# Patient Record
Sex: MALE | Race: WHITE
[De-identification: names, ages, dates, MRNs, and addresses within clinical notes are randomized per-mention and may not be internally consistent; named-entity substitution may affect disease eponyms.]

---

## 2017-09-28 NOTE — XMS
Summary of Care

 Created on:2017



Patient:FCO OSMAN

Sex:Male

:1975

External Reference #:[id number]





Demographics







 Address  [address] [place] [address]

 

 Phone  Unavailable

 

 Preferred Language  English

 

 Marital Status  Unknown

 

 Adventism Affiliation  Unknown

 

 Race  Other Race

 

 Ethnic Group  Not  or 









Author







 Name  Keith Perez

 

 Address  Unavailable



   Unavailable



   ,









Care Team Providers







 Name  Role  Phone

 

 Keith Perez  Unavailable  Unavailable

 

 HUNTER CERVANTES  Unavailable  Unavailable

 

 CAITLYN JONES MD  Primary Care Provider  Unavailable

 

 CAITLYN DWYER  Unavailable  Unavailable

 

 ,  Unavailable  Unavailable









Functional Status

Functional Status Health Issues





 Name  Dates  Details

 

 Functional status health issues are not documented    Status:



Cognitive Status Health Issues





 Name  Dates  Details

 

 Cognitive status health issues are not documented    Status:







Problems







 Name  Dates  Details

 

 Intracranial injury, initial encounter    Status:Active

 

 Cerumen impaction(380.4, H61.20)    Status:Active

 

 Well adult on routine health check(V70.0, Z00.00)    Status:Active

 

 Influenza vaccine needed(V04.81, Z23)    Status:Active

 

 Hypothyroidism(244.9, E03.9)    Status:Active

 

 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

 

 Depressive disorder(311, F32.9)    Status:Active

 

 Behavioral problems(V40.9)    Status:Active

 

 Head injury(959.01, S09.90XA)    Status:Active

 

 Essential hypertriglyceridemia(272.1, E78.1)    Status:Active

 

 Elevated liver function tests(790.6, R94.5)    Status:Active

 

 Hyperlipidemia(272.4, E78.5)    Status:Active

 

 Pre-diabetes(790.29, R73.03)    Status:Active

 

 Need for hepatitis A and B vaccination(V05.3, Z23)    Status:Active







Medications







 Name  Dates  Details









 Divalproex Sodium  MG Oral Tablet Extended Release 24 Hour



 TAKE THREE (3) TABLETS BY MOUTH EVERY MORNING AND THREE (3) TABLETS EVERY 
EVENING.









   Quantity:180  Refills:0







HUNTER CERVANTES M.D.





 Started4-May-2010



ActiveSertraline HCl - 100 MG Oral Tablet

TAKE ONE (1) TABLET(S) BY MOUTH TWICE A DAY.







 Quantity:60  Refills:0







HUNTER CERVANTES M.D.





 Started4-May-2010



ActiveLevothyroxine Sodium 100 MCG Oral Tablet

TAKE 1 TABLET DAILY AS DIRECTED.







 Quantity:90  Refills:1







Keith Perez N.P.





 [id number]



ActiveRosuvastatin Calcium 10 MG Oral Tablet

TAKE ONE (1) TABLET(S) BY MOUTH ONCE A DAY.







 Quantity:90  Refills:1







Keith Perez N.PKallie





 Started4-Dec-2012



ActiveOmega-3-acid Ethyl Esters 1 GM Oral Capsule

TAKE TWO (2) CAPSULE(S) BY MOUTH TWICE A DAY.







 Quantity:360  Refills:0







Keith Perez N.JONATHON





 [id number]



ActiveFenofibrate 145 MG Oral Tablet

TAKE 1 TABLET DAILY.







 Quantity:90  Refills:1







Keith Perez N.JONATHON





 Started4-Dec-2012



ActiveOLANZapine 5 MG Oral Tablet

TAKE ONE (1) TABLET(S) BY MOUTH AT BEDTIME. TAKE WITH OLANZAPINE 20 MG.







 Quantity:30  Refills:0







HUNTER CERVANTES M.D.





 [id number]



ActiveOmega-3-acid Ethyl Esters 1 GM Oral Capsule

TAKE 2 CAPSULE EVERY 12 HOURS







 Quantity:360  Refills:3







Keith Perez N.PKallie





 [id number]



ActiveFenofibrate 145 MG Oral Tablet

TAKE one (1) tablet(s) by mouth every day.







 Quantity:90  Refills:3







Keith Perez N.JONATHON





 Started10-Sep-2014



ActiveOLANZapine 20 MG Oral Tablet

TAKE ONE (1) TABLET(S) BY MOUTH AT BEDTIME ALONG WITH OLANZAPINE 5MG.







 Quantity:30  Refills:0







HUNTER CERVANTES M.D.





 Started24-Sep-2014



Active



Allergies and Adverse Reactions







 Name  Dates  Details

 

 No Known Drug Allergies    Status:Active







Past Medical History







 Name  Dates  Details

 

 Head injury(959.01, S09.90XA)    Status:Active

 

 History of Acute upper respiratory infection(465.9, J06.9)    Status:Resolved

 

 History of Cough(786.2, R05)    Status:Resolved

 

 History of essential hypertension(V12.59, Z86.79)    Status:Resolved

 

 History of hyperlipidemia(V12.29, Z86.39)    Status:Resolved

 

 History of hypothyroidism(V12.29, Z86.39)    Status:Resolved

 

 History of obesity(V13.89, Z86.39)    Status:Resolved







Procedures







 Procedure  Dates  Details

 

 History of Brain Surgery    

 

 History of Inguinal Hernia Repair    

 

 History of Creation Of Ventriculo-Peritoneal CSF Shunt    

 

 [[facility]] LIPID PANEL  Ordered:2017  

 

 [[facility]] CMP W/EGFR  Ordered:2017  

 

 [[facility]] HEMOGLOBIN A1c  Ordered:2017  

 

  Liver [address]  Ordered:2017  







Immunization







 Name  Dates  Details

 

 Influenza  Administered on:20-Oct-2011  



 Lot #:[id number]    

 

 Influenza  Administered on:2014  



 Lot #:[id number]    

 

 Fluzone Quadrivalent 0.5 ML Intramuscular Suspension  Administered on:2016  



 Lot #:[id number]    

 

 Fluzone Quadrivalent 0.5 ML Intramuscular Suspension Prefilled Syringe  
Administered on:26-Oct-2016  



 Lot #:[id number]    







Family History

Mother





 Name  Dates  Details

 

 Family history of dementia(V17.2, Z81.8)    Status:Active

 

 Family history of depression(V17.0, Z81.8)    Status:Active



Father





 Name  Dates  Details

 

 Family history of dementia(V17.2, Z81.8)    Status:Active

 

 Family history of Alcohol dependence(303.90, F10.20)    Status:Active



Sister





 Name  Dates  Details

 

 Family history of depression(V17.0, Z81.8)    Status:Active



Brother





 Name  Dates  Details

 

 Family history of depression(V17.0, Z81.8)    Status:Active







Social History







 Name  Dates  Details

 

 -    



Smoking StatusNever smoker



Vital Signs







 Date  Test  Result  Details

 

       

 

 2017 11:10  BP Systolic  111mm[Hg]  Status:









 BP Diastolic  74mm[Hg]  Status:

 

 Temperature  97.9f  Status:

 

 Heart Rate  77/min  Status:

 

 Physical Findings  16  Status:Comments:Respiration

 

 Height  72in  Status:

 

 Weight  242.25lb  Status:

 

 Body Mass Index Calculated  32.86kg/m2  Status:

 

 Body Surface Area Calculated  2.31m2  Status:









       

 

 2017 09:45  BP Systolic  118mm[Hg]  Status:









 BP Diastolic  76mm[Hg]  Status:

 

 Height  72in  Status:

 

 Weight  242lb  Status:

 

 Body Mass Index Calculated  32.82kg/m2  Status:

 

 Body Surface Area Calculated  2.31m2  Status:







Results







 Date  Description  Value  Details

 

   Results not documented    



       

 

       







Plan of Care

Planned Observations





 Name  Dates  Details

 

 Planned Goals not documented    Goal



Planned Encounters





 Follow-up visit in 4 months  









 Appointment; Provider: HUNTER CERVANTES  [id number]



   11:00



Planned Medications





 Name  Dates  Details

 

 HAV-HBV (Twinrix)  Ordered:1-Mar-2017  ActiveComments:To be Done: Dates 
Schedule; 2017; 2017







Interventions Provided

Labs/Procedures/Imaging[[facility]] CMP W/EGFR; To be Done: 2017[[facility]] 
HEMOGLOBIN A1c; To be Done: 2017[[facility]] LIPID PANEL; To be Done: 2017US Liver [address]; To be Done: 2017InstructionsPatient Specific 
Education Given; Done:



Instructions

Instructions not documented



Encounters







 Appointment; HUNTER CERVANTES  [id number]



 Encounter Diagnosis:Problem not documented  09:30

 

 Appointment; Keith Perez  



 Encounter Diagnosis:Problem not documented  11:00

 

 Appointment; Keith Perez  On26-Oct-2016



 Encounter Diagnosis:Problem not documented  10:30

 

 Appointment; HUNTER CERVANTES  On10-Oct-2016



 Encounter Diagnosis:Problem not documented  13:30

 

 Appointment; Keith Perez  



 Encounter Diagnosis:Problem not documented  14:00

 

 Appointment; Keith Perez  



 Encounter Diagnosis:Problem not documented  14:00

 

 Appointment; HUNTER CERVANTES  



 Encounter Diagnosis:Problem not documented  10:30

 

 Appointment; HUNTER CERVANTES  



 Encounter Diagnosis:Problem not documented  13:00

 

 Appointment; Keith Perez  



 Encounter Diagnosis:Problem not documented  13:00

 

 Appointment; YOLANDA MAGDALENO  



 Encounter Diagnosis:Problem not documented  11:00

 

 Appointment; Keith Perez  



 Encounter Diagnosis:Problem not documented  14:00

 

 Appointment; HAMMAD DASH  On25-Mar-2015



 Encounter Diagnosis:Problem not documented  11:00

## 2017-09-28 NOTE — RAD
SHUNTOGRAM:

 

History: Lethargy. 42-year-old with ventriculoperitoneal shunt. 

 

Technique: AP skull and soft tissue neck, AP view chest, lateral view soft tissue neck, lateral view
 chest, AP abdomen, lateral view of the abdomen. 

 

FINDINGS: 

There is a large amount of stool in the colon. 

 

There is a right sided intracranial ventricular shunt which has a segment absent from the point wher
e it leaves the skull until the C3 level. The shunt extends along the lateral aspect of the soft tis
sues of the neck extending along the anterior aspect of the chest extending into the right pleural s
urface. 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 The proximal portion along the la
teral aspect of the neck and scalp appears to have disrupted and does not communicate with the intra
cranial portion and the right pleural surface. A small right sided pleural effusion is present. The 
left lung is well aerated. 

 

POS: [facility]

## 2017-09-28 NOTE — XMS
Summary of Care

 Created on:March 3, 2017



Patient:FCO OSMAN

Sex:Male

:1975

External Reference #:[id number]





Demographics







 Address  [address] [place] [address]

 

 Phone  Unavailable

 

 Preferred Language  Unknown

 

 Marital Status  Unknown

 

 Muslim Affiliation  Unknown

 

 Race  White

 

 Ethnic Group  Not  or 









Author







 Name  Grace Mariee

 

 Address  Unavailable



   Unavailable



   ,









Care Team Providers







 Name  Role  Phone

 

 Keith Perez  Unavailable  Unavailable

 

 Grace Mariee  Unavailable  Unavailable

 

 HUNTER CERVANTES  Unavailable  Unavailable

 

 CAITLYN JONES MD  Primary Care Provider  Unavailable

 

 CAITLYN DWYER  Unavailable  Unavailable

 

 ,  Unavailable  Unavailable









Functional Status

Functional Status Health Issues





 Name  Dates  Details

 

 Functional status health issues are not documented    Status:



Cognitive Status Health Issues





 Name  Dates  Details

 

 Cognitive status health issues are not documented    Status:







Problems







 Name  Dates  Details

 

 Intracranial injury, initial encounter    Status:Active

 

 Cerumen impaction(380.4, H61.20)    Status:Active

 

 Well adult on routine health check(V70.0, Z00.00)    Status:Active

 

 Influenza vaccine needed(V04.81, Z23)    Status:Active

 

 Hypothyroidism(244.9, E03.9)    Status:Active

 

 Head injury(959.01, S09.90XA)    Status:Active

 

 Essential hypertriglyceridemia(272.1, E78.1)    Status:Active

 

 Elevated liver function tests(790.6, R94.5)    Status:Active

 

 Hyperlipidemia(272.4, E78.5)    Status:Active

 

 Pre-diabetes(790.29, R73.03)    Status:Active

 

 Need for hepatitis A and B vaccination(V05.3, Z23)    Status:Active

 

 Behavioral problems(V40.9)    Status:Active

 

 Depressive disorder(311, F32.9)    Status:Active

 

 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







Medications







 Name  Dates  Details









 Divalproex Sodium  MG Oral Tablet Extended Release 24 Hour



 TAKE THREE (3) TABLETS BY MOUTH EVERY MORNING AND THREE (3) TABLETS EVERY 
EVENING.









   Quantity:540  Refills:0







HUNTER CERVANTES M.D.





 Started4-May-2010



ActiveSertraline HCl - 100 MG Oral Tablet

TAKE ONE (1) TABLET(S) BY MOUTH TWICE A DAY.







 Quantity:180  Refills:0







HUNTER CERVANTES M.D.





 Started4-May-2010



ActiveLevothyroxine Sodium 100 MCG Oral Tablet

TAKE 1 TABLET DAILY AS DIRECTED.







 Quantity:90  Refills:1







ChrisKeith N.P.





 [id number]



ActiveRosuvastatin Calcium 10 MG Oral Tablet

TAKE ONE (1) TABLET(S) BY MOUTH ONCE A DAY.







 Quantity:90  Refills:3







Chris Keith N.JONATHON





 Started3-Mar-2017



ActiveOmega-3-acid Ethyl Esters 1 GM Oral Capsule

TAKE TWO (2) CAPSULE(S) BY MOUTH TWICE A DAY.







 Quantity:360  Refills:0







Keith Perez N.JONATHON





 [id number]



ActiveFenofibrate 145 MG Oral Tablet

TAKE 1 TABLET DAILY.







 Quantity:90  Refills:1







Keith Perez.JONATHON





 Started4-Dec-2012



ActiveOLANZapine 5 MG Oral Tablet

TAKE ONE (1) TABLET(S) BY MOUTH AT BEDTIME. TAKE WITH OLANZAPINE 20 MG.







 Quantity:90  Refills:0







HUNTER CERVANTES M.D.





 [id number]



ActiveOmega-3-acid Ethyl Esters 1 GM Oral Capsule

TAKE 2 CAPSULE EVERY 12 HOURS







 Quantity:360  Refills:3







LewiswiKeith carpio N.PKallie





 [id number]



ActiveFenofibrate 145 MG Oral Tablet

TAKE one (1) tablet(s) by mouth every day.







 Quantity:90  Refills:3







Jordinmyeshalisa Keith N.JONATHON





 Started10-Sep-2014



ActiveOLANZapine 20 MG Oral Tablet

TAKE ONE (1) TABLET(S) BY MOUTH AT BEDTIME ALONG WITH OLANZAPINE 5MG.







 Quantity:90  Refills:0







HUNTER CERVANTES M.D.





 Started24-Sep-2014



Active



Allergies and Adverse Reactions







 Name  Dates  Details

 

 No Known Drug Allergies    Status:Active







Past Medical History







 Name  Dates  Details

 

 Head injury(959.01, S09.90XA)    Status:Active

 

 History of Acute upper respiratory infection(465.9, J06.9)    Status:Resolved

 

 History of Cough(786.2, R05)    Status:Resolved

 

 History of essential hypertension(V12.59, Z86.79)    Status:Resolved

 

 History of hyperlipidemia(V12.29, Z86.39)    Status:Resolved

 

 History of hypothyroidism(V12.29, Z86.39)    Status:Resolved

 

 History of obesity(V13.89, Z86.39)    Status:Resolved







Procedures







 Procedure  Dates  Details

 

 History of Brain Surgery    

 

 History of Inguinal Hernia Repair    

 

 History of Creation Of Ventriculo-Peritoneal CSF Shunt    

 

 [[facility]] LIPID PANEL  Ordered:2017  

 

 [[facility]] CMP W/EGFR  Ordered:2017  

 

 [[facility]] HEMOGLOBIN A1c  Ordered:2017  

 

  Liver [address]  Ordered:2017  







Immunization







 Name  Dates  Details

 

 Influenza  Administered on:20-Oct-2011  



 Lot #:[id number]    

 

 Influenza  Administered on:2014  



 Lot #:[id number]    

 

 Fluzone Quadrivalent 0.5 ML Intramuscular Suspension  Administered on:2016  



 Lot #:[id number]    

 

 Fluzone Quadrivalent 0.5 ML Intramuscular Suspension Prefilled Syringe  
Administered on:26-Oct-2016  



 Lot #:[id number]    







Family History

Mother





 Name  Dates  Details

 

 Family history of dementia(V17.2, Z81.8)    Status:Active

 

 Family history of depression(V17.0, Z81.8)    Status:Active



Father





 Name  Dates  Details

 

 Family history of dementia(V17.2, Z81.8)    Status:Active

 

 Family history of Alcohol dependence(303.90, F10.20)    Status:Active



Sister





 Name  Dates  Details

 

 Family history of depression(V17.0, Z81.8)    Status:Active



Brother





 Name  Dates  Details

 

 Family history of depression(V17.0, Z81.8)    Status:Active







Social History







 Name  Dates  Details

 

 -    



Smoking StatusNever smoker



Vital Signs







 Date  Test  Result  Details

 

       

 

 2017 11:10  BP Systolic  111mm[Hg]  Status:









 BP Diastolic  74mm[Hg]  Status:

 

 Temperature  97.9f  Status:

 

 Heart Rate  77/min  Status:

 

 Physical Findings  16  Status:Comments:Respiration

 

 Height  72in  Status:

 

 Weight  242.25lb  Status:

 

 Body Mass Index Calculated  32.86kg/m2  Status:

 

 Body Surface Area Calculated  2.31m2  Status:







Results







 Date  Description  Value  Details

 

   Results not documented    



       

 

       







Plan of Care

Planned Observations





 Name  Dates  Details

 

 Planned Goals not documented    Goal



Planned Encounters





 Appointment; Provider: Keith Perez  [id number]



   10:45







Interventions Provided

Medication ChangesRosuvastatin Calcium 10 MG Oral Tablet - Renew



Instructions

Instructions not documented



Encounters







 Appointment; Keith Perez  [id number]



 Encounter Diagnosis:Problem not documented  11:15

 

 Appointment; HUNTER CERVANTES  



 Encounter Diagnosis:Problem not documented  09:30

 

 Appointment; Keith Perez  



 Encounter Diagnosis:Problem not documented  11:00

 

 Appointment; Keith Perez  On26-Oct-2016



 Encounter Diagnosis:Problem not documented  10:30

 

 Appointment; HUNTER CERVANTES  On10-Oct-2016



 Encounter Diagnosis:Problem not documented  13:30

 

 Appointment; Keith Perez  



 Encounter Diagnosis:Problem not documented  14:00

 

 Appointment; Keith Perez  



 Encounter Diagnosis:Problem not documented  14:00

 

 Appointment; HUNTER CERVANTES  



 Encounter Diagnosis:Problem not documented  10:30

 

 Appointment; HUNTER CERVANTES  



 Encounter Diagnosis:Problem not documented  13:00

 

 Appointment; Keith Perez  



 Encounter Diagnosis:Problem not documented  13:00

 

 Appointment; YOLANDA MAGDALENO  



 Encounter Diagnosis:Problem not documented  11:00

 

 Appointment; Keith Perez  



 Encounter Diagnosis:Problem not documented  14:00

 

 Appointment; HAMMAD DASH  On25-Mar-2015



 Encounter Diagnosis:Problem not documented  11:00

## 2017-09-28 NOTE — XMS
Summary of Care

 Created on:April 3, 2017



Patient:FCO OSMAN

Sex:Male

:1975

External Reference #:[id number]





Demographics







 Address  [address] [place] [address]

 

 Phone  Unavailable

 

 Preferred Language  Unknown

 

 Marital Status  Unknown

 

 [employer] Affiliation  Unknown

 

 Race  White

 

 Ethnic Group  Not  or 









Author







 Name  Grace Mariee

 

 Address  Unavailable



   Unavailable



   ,









Care Team Providers







 Name  Role  Phone

 

 Keith Perez  Unavailable  Unavailable

 

 HUNTER CERVANTES  Unavailable  Unavailable

 

 CAITLYN JONES MD  Primary Care Provider  Unavailable

 

 CAITLYN DWYER  Unavailable  Unavailable

 

 ,  Unavailable  Unavailable









Functional Status

Functional Status Health Issues





 Name  Dates  Details

 

 Functional status health issues are not documented    Status:



Cognitive Status Health Issues





 Name  Dates  Details

 

 Cognitive status health issues are not documented    Status:







Problems







 Name  Dates  Details

 

 Intracranial injury, initial encounter    Status:Active

 

 Cerumen impaction(380.4, H61.20)    Status:Active

 

 Well adult on routine health check(V70.0, Z00.00)    Status:Active

 

 Influenza vaccine needed(V04.81, Z23)    Status:Active

 

 Hypothyroidism(244.9, E03.9)    Status:Active

 

 Head injury(959.01, S09.90XA)    Status:Active

 

 Essential hypertriglyceridemia(272.1, E78.1)    Status:Active

 

 Elevated liver function tests(790.6, R94.5)    Status:Active

 

 Hyperlipidemia(272.4, E78.5)    Status:Active

 

 Pre-diabetes(790.29, R73.03)    Status:Active

 

 Need for hepatitis A and B vaccination(V05.3, Z23)    Status:Active

 

 Behavioral problems(V40.9)    Status:Active

 

 Depressive disorder(311, F32.9)    Status:Active

 

 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







Medications







 Name  Dates  Details









 Divalproex Sodium  MG Oral Tablet Extended Release 24 Hour



 TAKE THREE (3) TABLETS BY MOUTH EVERY MORNING AND THREE (3) TABLETS EVERY 
EVENING.









   Quantity:540  Refills:0







HUNTER CERVANTES M.D.





 Started4-May-2010



ActiveSertraline HCl - 100 MG Oral Tablet

TAKE ONE (1) TABLET(S) BY MOUTH TWICE A DAY.







 Quantity:180  Refills:0







HUNTER CERVANTES M.D.





 Started4-May-2010



ActiveLevothyroxine Sodium 100 MCG Oral Tablet

TAKE 1 TABLET DAILY AS DIRECTED.







 Quantity:90  Refills:1







LewiswiKeith carpio N.JONATHON





 [id number]



ActiveRosuvastatin Calcium 10 MG Oral Tablet

TAKE ONE (1) TABLET(S) BY MOUTH ONCE A DAY.







 Quantity:90  Refills:3







Chris Keith N.P.





 Started4-Dec-2012



ActiveOmega-3-acid Ethyl Esters 1 GM Oral Capsule

TAKE TWO (2) CAPSULE(S) BY MOUTH TWICE A DAY.







 Quantity:360  Refills:0







Keith Perez N.PKallie





 [id number]



ActiveFenofibrate 145 MG Oral Tablet

TAKE 1 TABLET DAILY.







 Quantity:90  Refills:1







Keith Perez N.P.





 Started4-Dec-2012



ActiveOLANZapine 5 MG Oral Tablet

TAKE ONE (1) TABLET(S) BY MOUTH AT BEDTIME. TAKE WITH OLANZAPINE 20 MG.







 Quantity:90  Refills:0







HUNTER CERAVNTES M.D.





 [id number]



ActiveOmega-3-acid Ethyl Esters 1 GM Oral Capsule

TAKE 2 CAPSULE EVERY 12 HOURS







 Quantity:360  Refills:3







Keith Perez N.PKallie





 [id number]



ActiveFenofibrate 145 MG Oral Tablet

TAKE one (1) tablet(s) by mouth every day.







 Quantity:90  Refills:3







Keith Perez N.PKallie





 Started10-Sep-2014



ActiveOLANZapine 20 MG Oral Tablet

TAKE ONE (1) TABLET(S) BY MOUTH AT BEDTIME ALONG WITH OLANZAPINE 5MG.







 Quantity:90  Refills:0







HUNTER CERVANTES M.D.





 Started24-Sep-2014



Active



Allergies and Adverse Reactions







 Name  Dates  Details

 

 No Known Drug Allergies    Status:Active







Past Medical History







 Name  Dates  Details

 

 Head injury(959.01, S09.90XA)    Status:Active

 

 History of Acute upper respiratory infection(465.9, J06.9)    Status:Resolved

 

 History of Cough(786.2, R05)    Status:Resolved

 

 History of essential hypertension(V12.59, Z86.79)    Status:Resolved

 

 History of hyperlipidemia(V12.29, Z86.39)    Status:Resolved

 

 History of hypothyroidism(V12.29, Z86.39)    Status:Resolved

 

 History of obesity(V13.89, Z86.39)    Status:Resolved







Procedures







 Procedure  Dates  Details

 

 History of Brain Surgery    

 

 History of Inguinal Hernia Repair    

 

 History of Creation Of Ventriculo-Peritoneal CSF Shunt    

 

 Procedures not documented    







Immunization







 Name  Dates  Details

 

 Influenza  Administered on:20-Oct-2011  



 Lot #:[id number]    

 

 Influenza  Administered on:2014  



 Lot #:[id number]    

 

 Fluzone Quadrivalent 0.5 ML Intramuscular Suspension  Administered on:2016  



 Lot #:[id number]    

 

 Fluzone Quadrivalent 0.5 ML Intramuscular Suspension Prefilled Syringe  
Administered on:26-Oct-2016  



 Lot #:[id number]    







Family History

Mother





 Name  Dates  Details

 

 Family history of dementia(V17.2, Z81.8)    Status:Active

 

 Family history of depression(V17.0, Z81.8)    Status:Active



Father





 Name  Dates  Details

 

 Family history of dementia(V17.2, Z81.8)    Status:Active

 

 Family history of Alcohol dependence(303.90, F10.20)    Status:Active



Sister





 Name  Dates  Details

 

 Family history of depression(V17.0, Z81.8)    Status:Active



Brother





 Name  Dates  Details

 

 Family history of depression(V17.0, Z81.8)    Status:Active







Social History







 Name  Dates  Details

 

 -    



Smoking StatusNever smoker



Vital Signs







 Date  Test  Result  Details

 

       

 

   No Known Vitals to report    



       







Results







 Date  Description  Value  Details

 

 15-Mar-2017 11:36  [[facility]] CMP W/EGFR    

 

   Glucose, Serum  84  mg/dL  Range: 65-99

 

   BUN  7  mg/dL  Range: 6-24

 

   Creatine, Serum  0.66  mg/dL (Below  Range: 0.76-1.27



     low threshold)  

 

   eGFR If NonAfricn Am  119  mL/min/1.7  Range: >59

 

   eGFR If Africn Am  138  mL/min/1.7  Range: >59

 

   BUN/Creatine Ratio  11  Range: 9-20

 

   Sodium, Serum  138  mmol/L  Range: 134-144

 

   Potassium, Serum  4.1  mmol/L  Range: 3.5-5.2

 

   Chloride, Serum  94  mmol/L (Below  Range: 



     low threshold)  

 

   Carbon Dioxide, Total  27  mmol/L  Range: 18-29

 

   Calcium, Serum  9.5  mg/dL  Range: 8.7-10.2

 

   Protein, Total, Serum  6.7  g/dL  Range: 6.0-8.5

 

   Albumin, Serum  4.1  g/dL  Range: 3.5-5.5

 

   Globulin, Total  2.6  g/dL  Range: 1.5-4.5

 

   A/G Ratio  1.6  Range: 1.2-2.2



       Comments:**Please note reference interval change**

 

   Bilirubin, Total  0.3  mg/dL  Range: 0.0-1.2

 

   Alkaline Phosphatase, S  44  IU/L  Range: 

 

   AST (SGOT)  37  IU/L  Range: 0-40

 

   ALT (SGPT)  34  IU/L  Range: 0-44

 

 11:36  [[facility]] LIPID PANEL    

 

   Cholesterol, Total  137  mg/dL  Range: 100-199

 

   Triglycerides  210  mg/dL (Above  Range: 0-149



     high threshold)  

 

   HDL Cholesterol  28  mg/dL (Below low  Range: >39



     threshold)  

 

   VLDL Cholesterol Cheikh  42  mg/dL (Above  Range: 5-40



     high threshold)  

 

   LDL Cholesterol Calc  67  mg/dL  Range: 0-99

 

   Comment:    



       

 

   LDL/HDL Ratio  2.4  ratio unit  Range: 0.0-3.6



       Comments:LDL/HDL Ratio                                                  
         Men  Women                                             1/2 Avg.Risk  
1.0    1.5



       Avg.Risk  3.6    3.2                                              2X 
Avg.Risk  6.2    5.0                                              3X Avg.Risk  
8.0    6.1

 

 11:36  [QL] HEMOGLOBIN A1c    

 

   Hemoglobin A1c  5.6  %  Range: 4.8-5.6



       Comments:.         Pre-diabetes: 5.7 - 6.4         Diabetes: >6.4       
  Glycemic control for adults with diabetes: <7.0







Plan of Care

Planned Observations





 Name  Dates  Details

 

 Planned Goals not documented    Goal



Planned Encounters





 Appointment; Provider: Keith Perez  [id number]



   10:45







Instructions

Instructions not documented



Encounters







 Appointment; Keith Perez  



 Encounter Diagnosis:Problem not documented  11:15

 

 Appointment; HUNTER CERVANTES  



 Encounter Diagnosis:Problem not documented  09:30

 

 Appointment; Keith Perez  



 Encounter Diagnosis:Problem not documented  11:00

 

 Appointment; Keith Perez  On26-Oct-2016



 Encounter Diagnosis:Problem not documented  10:30

 

 Appointment; HUNTER CERVANTES  On10-Oct-2016



 Encounter Diagnosis:Problem not documented  13:30

 

 Appointment; Keith Perez  



 Encounter Diagnosis:Problem not documented  14:00

 

 Appointment; Keith Perez  



 Encounter Diagnosis:Problem not documented  14:00

 

 Appointment; HUNTER CERVANTES  



 Encounter Diagnosis:Problem not documented  10:30

 

 Appointment; HUNTER CERVANTES  



 Encounter Diagnosis:Problem not documented  13:00

 

 Appointment; Keith Perez  



 Encounter Diagnosis:Problem not documented  13:00

 

 Appointment; YOLANDA MAGDALENO  



 Encounter Diagnosis:Problem not documented  11:00

 

 Appointment; Keith Perez  



 Encounter Diagnosis:Problem not documented  14:00

## 2017-09-28 NOTE — CT
CT BRAIN WITHOUT CONTRAST:

 

History: Lethargy. Patient has a  shunt. 

 

Technique: The patient has a right posterior approach ventriculostomy catheter with its tip in the l
eft lateral ventricle. No hydrocephalus is seen. There is encephalomalacia in both frontal lobes and
 both temporal lobes. No intracranial hemorrhage is seen. No new large confluent infarction is seen.
 

 

The calvarium and overlying soft tissues are unremarkable. The visualized paranasal sinuses and mast
oid air cells are well aerated. 

 

IMPRESSION: 

1. No evidence of acute intracranial abnormality. 

2.  shunt appears in good position and without evidence of hydrocephalus. 

3. Encephalomalacia in the frontal lobes and temporal lobes. 

 

POS: [facility]

## 2017-09-28 NOTE — XMS
Summary of Care

 Created on:2016



Patient:FCO OSMAN

Sex:Male

:1975

External Reference #:[id number]





Demographics







 Address  [address] [place] [id number]

 

 Phone  Unavailable

 

 Preferred Language  English

 

 Marital Status  Unknown

 

 Spiritism Affiliation  Unknown

 

 Race  White

 

 Ethnic Group  Not  or 









Author







 Name  Jennifer Banuelos

 

 Address  Unavailable



   Unavailable



   ,









Care Team Providers







 Name  Role  Phone

 

 Keith Perez  Unavailable  Unavailable

 

 HUNTER CERVANTES  Unavailable  Unavailable

 

 Jennifer Banuelos  Unavailable  Unavailable

 

 CAITLYN JONES MD  Primary Care Provider  Unavailable

 

 CAITLYN DWYER  Unavailable  Unavailable

 

 ,  Unavailable  Unavailable









Functional Status

Functional Status Health Issues





 Name  Dates  Details

 

 Functional status health issues are not documented    Status:



Cognitive Status Health Issues





 Name  Dates  Details

 

 Cognitive status health issues are not documented    Status:







Problems







 Name  Dates  Details

 

 Intracranial injury, initial encounter    Status:Active

 

 Cerumen impaction(380.4, H61.20)    Status:Active

 

 Hyperlipidemia(272.4, E78.5)    Status:Active

 

 Well adult on routine health check(V70.0, Z00.00)    Status:Active

 

 Influenza vaccine needed(V04.81, Z23)    Status:Active

 

 Hypothyroidism(244.9, E03.9)    Status:Active

 

 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

 

 Depressive disorder(311, F32.9)    Status:Active

 

 Behavioral problems(V40.9)    Status:Active

 

 Head injury(959.01, S09.90XA)    Status:Active

 

 Essential hypertriglyceridemia(272.1, E78.1)    Status:Active

 

 Elevated liver function tests(790.6, R79.89)    Status:Active

 

 Pre-diabetes(790.29, R73.09)    Status:Active







Medications







 Name  Dates  Details









 Divalproex Sodium  MG Oral Tablet Extended Release 24 Hour



 Take three tablets every morning and three tablets every evening.









   Quantity:180  Refills:1







HUNTER CERVANTES M.D.





 Started4-May-2010



ActiveSertraline HCl - 100 MG Oral Tablet

TAKE ONE (1) TABLET(S) BY MOUTH TWICE A DAY.







 Quantity:60  Refills:2







HUNTER CERVANTES M.D.





 Started4-May-2010



ActiveLevothyroxine Sodium 100 MCG Oral Tablet

TAKE 1 TABLET DAILY AS DIRECTED.







 Quantity:90  Refills:1







Keith PerezWILNER





 [id number]



ActiveRosuvastatin Calcium 10 MG Oral Tablet

TAKE ONE (1) TABLET(S) BY MOUTH ONCE A DAY.







 Quantity:90  Refills:1







Keith Perez N.PKallie





 Started4-Dec-2012



ActiveOmega-3-acid Ethyl Esters 1 GM Oral Capsule

TAKE 2 CAPSULES TWICE DAILY.







 Quantity:360  Refills:1







Keith Perez N.PKallieActiveFenofibrate 145 MG Oral Tablet

TAKE 1 TABLET DAILY.







 Quantity:90  Refills:1







Tomer Perezin N.PKallie





 Started4-Dec-2012



ActiveOLANZapine 5 MG Oral Tablet

TAKE ONE (1) TABLET(S) BY MOUTH AT BEDTIME. TAKE WITH OLANZAPINE 20 MG.







 Quantity:30  Refills:2







HUNTER CERVANTES M.D.





 [id number]



ActiveOmega-3-acid Ethyl Esters 1 GM Oral Capsule

TAKE 2 CAPSULE EVERY 12 HOURS







 Quantity:360  Refills:3







Keith Perez N.JONATHON





 [id number]



ActiveFenofibrate 145 MG Oral Tablet

TAKE one (1) tablet(s) by mouth every day.







 Quantity:90  Refills:0







Tomer Perezin N.PKallie





 Started10-Sep-2014



ActiveOLANZapine 20 MG Oral Tablet

TAKE ONE (1) TABLET(S) BY MOUTH AT BEDTIME ALONG WITH OLANZAPINE 5MG.







 Quantity:30  Refills:2







HUNTER CERVANTES M.D.





 Started24-Sep-2014



Active



Allergies and Adverse Reactions







 Name  Dates  Details

 

 No Known Drug Allergies    Status:Active







Past Medical History







 Name  Dates  Details

 

 Head injury(959.01, S09.90XA)    Status:Active

 

 History of Acute upper respiratory infection(465.9, J06.9)    Status:Resolved

 

 History of Cough(786.2, R05)    Status:Resolved

 

 History of essential hypertension(V12.59, Z86.79)    Status:Resolved

 

 History of hyperlipidemia(V12.29, Z86.39)    Status:Resolved

 

 History of hypothyroidism(V12.29, Z86.39)    Status:Resolved

 

 History of obesity(V13.89, Z86.39)    Status:Resolved







Procedures







 Procedure  Dates  Details

 

 History of Brain Surgery    

 

 History of Inguinal Hernia Repair    

 

 History of Creation Of Ventriculo-Peritoneal CSF Shunt    

 

 [[facility]] VALPROIC ACID  Pending:10-Oct-2016  

 

 [QL] COMPREHENSIVE METABOLIC PANEL W/O eGFR  Pending:10-Oct-2016  

 

 [QL] LIPID PANEL  Pending:10-Oct-2016  

 

 [QL] HEMOGLOBIN A1c  Pending:10-Oct-2016  

 

 [QL] LIPID PANEL  Ordered:26-Oct-2016  

 

 [QL] CMP W/EGFR  Ordered:26-Oct-2016  

 

 [QL] HEMOGLOBIN A1c  Ordered:26-Oct-2016  

 

 [QL] HEPATITIS B SURFACE ANTIBODY (QUANT)  Ordered:26-Oct-2016  

 

 [[facility]] HEPATITIS A AB, TOTAL  Ordered:26-Oct-2016  

 

 [QL] HEPATITIS PANEL  Ordered:26-Oct-2016  

 

  Liver 38633  Ordered:26-Oct-2016  







Immunization







 Name  Dates  Details

 

 Influenza  Administered on:20-Oct-2011  



 Lot #:[id number]    

 

 Influenza  Administered on:2014  



 Lot #:[id number]    

 

 Fluzone Quadrivalent 0.5 ML Intramuscular Suspension  Administered on:2016  



 Lot #:[id number]    

 

 Fluzone Quadrivalent 0.5 ML Intramuscular Suspension Prefilled Syringe  
Administered on:26-Oct-2016  



 Lot #:[id number]    







Family History

Mother





 Name  Dates  Details

 

 Family history of dementia(V17.2, Z81.8)    Status:Active

 

 Family history of depression(V17.0, Z81.8)    Status:Active



Father





 Name  Dates  Details

 

 Family history of dementia(V17.2, Z81.8)    Status:Active

 

 Family history of Alcohol dependence(303.90, F10.20)    Status:Active



Sister





 Name  Dates  Details

 

 Family history of depression(V17.0, Z81.8)    Status:Active



Brother





 Name  Dates  Details

 

 Family history of depression(V17.0, Z81.8)    Status:Active







Social History







 Name  Dates  Details

 

 -    



Smoking StatusNever smoker



Vital Signs







 Date  Test  Result  Details

 

       

 

 26-Oct-2016 11:10  BP Systolic  122mm[Hg]  Status:









 BP Diastolic  77mm[Hg]  Status:

 

 Temperature  97.7f  Status:

 

 Heart Rate  76/min  Status:

 

 Physical Findings  16  Status:Comments:Respiration

 

 Height  72in  Status:

 

 Weight  256lb  Status:

 

 Body Mass Index Calculated  34.72kg/m2  Status:

 

 Body Surface Area Calculated  2.37m2  Status:







Results







 Date  Description  Value  Details

 

 27-Oct-2016 10:10  [[facility]] HEPATITIS PANEL    

 

   Hep A Ab, IgM  Negative  Range: Negative

 

   HBsAg Screen  Negative  Range: Negative

 

   Hep B Core Ab, IgM  Negative  Range: Negative

 

   Hep C Virus Ab  <0.1  s/co ratio  Range: 0.0-0.9



       Comments:Negative:     < 0.8                                            
Indeterminate: 0.8 - 0.9                                                 
Positive:     > 0.9



       .                The CDC recommends that a positive HCV antibody result 
               be followed up with a HCV Nucleic Acid Amplification            
    test ([id number]).

 

 10:10  [[facility]] HEPATITIS B    



   SURFACE ANTIBODY    



   (QUANT)    

 

   Hep B Surface Ab, Qual  Non Reactive  Comments:Non Reactive: Inconsistent 
with immunity,                                           less than 10 mIU/mL   
                          Reactive:     Consistent with immunity,greater than 
9.9 mIU/mL

 

 10:10  [[facility]] HEPATITIS A AB,    



   TOTAL    

 

   Hep A Ab, Total  Negative  Range: Negative







Plan of Care

Planned Observations





 Name  Dates  Details

 

 Planned Goals not documented    Goal







Instructions

Instructions not documented



Encounters







 Appointment; Keith Perez  On26-Oct-2016



 Encounter Diagnosis:Problem not documented  10:30

 

 Appointment; HUNTER CERVANTES  On10-Oct-2016



 Encounter Diagnosis:Problem not documented  13:30

 

 Appointment; Keith Perez  



 Encounter Diagnosis:Problem not documented  14:00

 

 Appointment; Keith Perez  



 Encounter Diagnosis:Problem not documented  14:00

 

 Appointment; HUNTER CERVANTES  



 Encounter Diagnosis:Problem not documented  10:30

 

 Appointment; HUNTER CERVANTES  



 Encounter Diagnosis:Problem not documented  13:00

 

 Appointment; Keith Perez  



 Encounter Diagnosis:Problem not documented  13:00

 

 Appointment; YOLANDA MAGDALENO  



 Encounter Diagnosis:Problem not documented  11:00

 

 Appointment; Keith Perez  



 Encounter Diagnosis:Problem not documented  14:00

 

 Appointment; HAMMAD DASH  On25-Mar-2015



 Encounter Diagnosis:Problem not documented  11:00

## 2017-09-28 NOTE — XMS
Summary of Care

 Created on:2017



Patient:FCO OSMAN

Sex:Male

:1975

External Reference #:[id number]





Demographics







 Address  [address] [place] [id number]

 

 Phone  Unavailable

 

 Preferred Language  English

 

 Marital Status  Unknown

 

 Roman [employer] Affiliation  Unknown

 

 Race  Other Race

 

 Ethnic Group  Not  or 









Author







 Name  Grace Mariee

 

 Address  Unavailable



   Unavailable



   ,









Care Team Providers







 Name  Role  Phone

 

 Keith Perez  Unavailable  Unavailable

 

 Grace Mariee  Unavailable  Unavailable

 

 HUNTER CERVANTES  Unavailable  Unavailable

 

 CAITLYN JONES MD  Primary Care Provider  Unavailable

 

 CAITLYN DWYER  Unavailable  Unavailable

 

 ,  Unavailable  Unavailable









Functional Status

Functional Status Health Issues





 Name  Dates  Details

 

 Functional status health issues are not documented    Status:



Cognitive Status Health Issues





 Name  Dates  Details

 

 Cognitive status health issues are not documented    Status:







Problems







 Name  Dates  Details

 

 Intracranial injury, initial encounter    Status:Active

 

 Cerumen impaction(380.4, H61.20)    Status:Active

 

 Hyperlipidemia(272.4, E78.5)    Status:Active

 

 Well adult on routine health check(V70.0, Z00.00)    Status:Active

 

 Influenza vaccine needed(V04.81, Z23)    Status:Active

 

 Hypothyroidism(244.9, E03.9)    Status:Active

 

 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

 

 Depressive disorder(311, F32.9)    Status:Active

 

 Behavioral problems(V40.9)    Status:Active

 

 Head injury(959.01, S09.90XA)    Status:Active

 

 Essential hypertriglyceridemia(272.1, E78.1)    Status:Active

 

 Elevated liver function tests(790.6, R94.5)    Status:Active

 

 Pre-diabetes(790.29, R73.03)    Status:Active







Medications







 Name  Dates  Details









 Divalproex Sodium  MG Oral Tablet Extended Release 24 Hour



 TAKE THREE (3) TABLETS BY MOUTH EVERY MORNING AND THREE (3) TABLETS EVERY 
EVENING.









   Quantity:180  Refills:0







HUNTER CERVANTES M.D.





 Started4-May-2010



ActiveSertraline HCl - 100 MG Oral Tablet

TAKE ONE (1) TABLET(S) BY MOUTH TWICE A DAY.







 Quantity:60  Refills:2







HUNTER CERVANTES M.D.





 Started4-May-2010



ActiveLevothyroxine Sodium 100 MCG Oral Tablet

TAKE 1 TABLET DAILY AS DIRECTED.







 Quantity:90  Refills:1







Lewiswimyeshalisa Keith N.JONATHON





 [id number]



ActiveRosuvastatin Calcium 10 MG Oral Tablet

TAKE ONE (1) TABLET(S) BY MOUTH ONCE A DAY.







 Quantity:90  Refills:1







Chris Keith N.PKallie





 Started4-Dec-2012



ActiveOmega-3-acid Ethyl Esters 1 GM Oral Capsule

TAKE TWO (2) CAPSULE(S) BY MOUTH TWICE A DAY.







 Quantity:360  Refills:0







Lewiswibalaji Keith N.PKallie





 [id number]



ActiveOmega-3-acid Ethyl Esters 1 GM Oral Capsule

TAKE 2 CAPSULE EVERY 12 HOURS







 Quantity:360  Refills:3







Chris Keith N.JONATHON





 [id number]



ActiveOLANZapine 20 MG Oral Tablet

TAKE ONE (1) TABLET(S) BY MOUTH AT BEDTIME ALONG WITH OLANZAPINE 5MG.







 Quantity:30  Refills:2







HUNTER CERVANTES M.D.





 [id number]



ActiveOLANZapine 5 MG Oral Tablet

TAKE ONE (1) TABLET(S) BY MOUTH AT BEDTIME. TAKE WITH OLANZAPINE 20 MG.







 Quantity:30  Refills:2







HUNTER CERVANTES M.D.





 [id number]



ActiveFenofibrate 145 MG Oral Tablet

TAKE 1 TABLET DAILY.







 Quantity:90  Refills:1







LewiswiKeith carpio N.JONATHON





 Started4-Dec-2012



ActiveFenofibrate 145 MG Oral Tablet

TAKE one (1) tablet(s) by mouth every day.







 Quantity:90  Refills:3







LewiswiKeith carpio N.JONATHON





 Started10-Sep-2014



Active



Allergies and Adverse Reactions







 Name  Dates  Details

 

 No Known Drug Allergies    Status:Active







Past Medical History







 Name  Dates  Details

 

 Head injury(959.01, S09.90XA)    Status:Active

 

 History of Acute upper respiratory infection(465.9, J06.9)    Status:Resolved

 

 History of Cough(786.2, R05)    Status:Resolved

 

 History of essential hypertension(V12.59, Z86.79)    Status:Resolved

 

 History of hyperlipidemia(V12.29, Z86.39)    Status:Resolved

 

 History of hypothyroidism(V12.29, Z86.39)    Status:Resolved

 

 History of obesity(V13.89, Z86.39)    Status:Resolved







Procedures







 Procedure  Dates  Details

 

 History of Brain Surgery    

 

 History of Inguinal Hernia Repair    

 

 History of Creation Of Ventriculo-Peritoneal CSF Shunt    

 

 Procedures not documented    







Immunization







 Name  Dates  Details

 

 Influenza  Administered on:20-Oct-2011  



 Lot #:[id number]    

 

 Influenza  Administered on:2014  



 Lot #:[id number]    

 

 Fluzone Quadrivalent 0.5 ML Intramuscular Suspension  Administered on:2016  



 Lot #:[id number]    

 

 Fluzone Quadrivalent 0.5 ML Intramuscular Suspension Prefilled Syringe  
Administered on:26-Oct-2016  



 Lot #:[id number]    







Family History

Mother





 Name  Dates  Details

 

 Family history of dementia(V17.2, Z81.8)    Status:Active

 

 Family history of depression(V17.0, Z81.8)    Status:Active



Father





 Name  Dates  Details

 

 Family history of dementia(V17.2, Z81.8)    Status:Active

 

 Family history of Alcohol dependence(303.90, F10.20)    Status:Active



Sister





 Name  Dates  Details

 

 Family history of depression(V17.0, Z81.8)    Status:Active



Brother





 Name  Dates  Details

 

 Family history of depression(V17.0, Z81.8)    Status:Active







Social History







 Name  Dates  Details

 

 -    



Smoking StatusNever smoker



Vital Signs







 Date  Test  Result  Details

 

       

 

   No Known Vitals to report    



       







Results







 Date  Description  Value  Details

 

   Results not documented    



       

 

       







Plan of Care

Planned Observations





 Name  Dates  Details

 

 Planned Goals not documented    Goal







Interventions Provided

Medication ChangesOmega-3-acid Ethyl Esters 1 GM Oral Capsule - Renew



Instructions

Instructions not documented



Encounters







 Appointment; Keith Perez  On26-Oct-2016



 Encounter Diagnosis:Problem not documented  10:30

 

 Appointment; HUNTER CERVANTES  On10-Oct-2016



 Encounter Diagnosis:Problem not documented  13:30

 

 Appointment; Keith Perez  



 Encounter Diagnosis:Problem not documented  14:00

 

 Appointment; Keith Perez  



 Encounter Diagnosis:Problem not documented  14:00

 

 Appointment; HUNTER CERVANTES  



 Encounter Diagnosis:Problem not documented  10:30

 

 Appointment; HUNTER CERVANTES  



 Encounter Diagnosis:Problem not documented  13:00

 

 Appointment; Keith Perez  



 Encounter Diagnosis:Problem not documented  13:00

 

 Appointment; YOLANDA MAGDALENO  



 Encounter Diagnosis:Problem not documented  11:00

 

 Appointment; Keith Perez  



 Encounter Diagnosis:Problem not documented  14:00

 

 Appointment; HAMMAD DASH  On25-Mar-2015



 Encounter Diagnosis:Problem not documented  11:00

## 2017-09-28 NOTE — XMS
Summary of Care

 Created on:2017



Patient:FCO OSMAN

Sex:Male

:1975

External Reference #:[id number]





Demographics







 Address  [address] [place] [address]

 

 Phone  Unavailable

 

 Preferred Language  English

 

 Marital Status  Unknown

 

 Restorationism Affiliation  Unknown

 

 Race  Other Race

 

 Ethnic Group  Not  or 









Author







 Name  HUNTER CERVANTES

 

 Address  Unavailable



   Unavailable



   ,









Care Team Providers







 Name  Role  Phone

 

 Keith Perez  Unavailable  Unavailable

 

 HUNTER CERVANTES  Unavailable  Unavailable

 

 CAITLYN JONES MD  Primary Care Provider  Unavailable

 

 CAITLYN DWYER  Unavailable  Unavailable

 

 ,  Unavailable  Unavailable









Functional Status

Functional Status Health Issues





 Name  Dates  Details

 

 Functional status health issues are not documented    Status:



Cognitive Status Health Issues





 Name  Dates  Details

 

 Cognitive status health issues are not documented    Status:







Problems







 Name  Dates  Details

 

 Intracranial injury, initial encounter    Status:Active

 

 Cerumen impaction(380.4, H61.20)    Status:Active

 

 Hyperlipidemia(272.4, E78.5)    Status:Active

 

 Well adult on routine health check(V70.0, Z00.00)    Status:Active

 

 Influenza vaccine needed(V04.81, Z23)    Status:Active

 

 Hypothyroidism(244.9, E03.9)    Status:Active

 

 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

 

 Depressive disorder(311, F32.9)    Status:Active

 

 Behavioral problems(V40.9)    Status:Active

 

 Head injury(959.01, S09.90XA)    Status:Active

 

 Essential hypertriglyceridemia(272.1, E78.1)    Status:Active

 

 Elevated liver function tests(790.6, R94.5)    Status:Active

 

 Pre-diabetes(790.29, R73.03)    Status:Active







Medications







 Name  Dates  Details









 Divalproex Sodium  MG Oral Tablet Extended Release 24 Hour



 TAKE THREE (3) TABLETS BY MOUTH EVERY MORNING AND THREE (3) TABLETS EVERY 
EVENING.









   Quantity:180  Refills:0







HUNTER CERVANTES M.D.





 Started4-May-2010



ActiveSertraline HCl - 100 MG Oral Tablet

TAKE ONE (1) TABLET(S) BY MOUTH TWICE A DAY.







 Quantity:60  Refills:0







HUNTER CERVANTES M.D.





 Started4-May-2010



ActiveLevothyroxine Sodium 100 MCG Oral Tablet

TAKE 1 TABLET DAILY AS DIRECTED.







 Quantity:90  Refills:1







Keith Perez N.P.





 [id number]



ActiveRosuvastatin Calcium 10 MG Oral Tablet

TAKE ONE (1) TABLET(S) BY MOUTH ONCE A DAY.







 Quantity:90  Refills:1







Keith Perez N.P.





 Started4-Dec-2012



ActiveOmega-3-acid Ethyl Esters 1 GM Oral Capsule

TAKE TWO (2) CAPSULE(S) BY MOUTH TWICE A DAY.







 Quantity:360  Refills:0







Keith Perez N.P.





 [id number]



ActiveFenofibrate 145 MG Oral Tablet

TAKE 1 TABLET DAILY.







 Quantity:90  Refills:1







Keith Perez N.P.





 Started4-Dec-2012



ActiveOLANZapine 5 MG Oral Tablet

TAKE ONE (1) TABLET(S) BY MOUTH AT BEDTIME. TAKE WITH OLANZAPINE 20 MG.







 Quantity:30  Refills:0







HUNTER CERVANTES M.D.





 [id number]



ActiveOmega-3-acid Ethyl Esters 1 GM Oral Capsule

TAKE 2 CAPSULE EVERY 12 HOURS







 Quantity:360  Refills:3







Keith Perez N.P.





 [id number]



ActiveFenofibrate 145 MG Oral Tablet

TAKE one (1) tablet(s) by mouth every day.







 Quantity:90  Refills:3







Keith Perez N.P.





 Started10-Sep-2014



ActiveOLANZapine 20 MG Oral Tablet

TAKE ONE (1) TABLET(S) BY MOUTH AT BEDTIME ALONG WITH OLANZAPINE 5MG.







 Quantity:30  Refills:0







HUNTER CERVANTES M.D.





 Started24-Sep-2014



Active



Allergies and Adverse Reactions







 Name  Dates  Details

 

 No Known Drug Allergies    Status:Active







Past Medical History







 Name  Dates  Details

 

 Head injury(959.01, S09.90XA)    Status:Active

 

 History of Acute upper respiratory infection(465.9, J06.9)    Status:Resolved

 

 History of Cough(786.2, R05)    Status:Resolved

 

 History of essential hypertension(V12.59, Z86.79)    Status:Resolved

 

 History of hyperlipidemia(V12.29, Z86.39)    Status:Resolved

 

 History of hypothyroidism(V12.29, Z86.39)    Status:Resolved

 

 History of obesity(V13.89, Z86.39)    Status:Resolved







Procedures







 Procedure  Dates  Details

 

 History of Brain Surgery    

 

 History of Inguinal Hernia Repair    

 

 History of Creation Of Ventriculo-Peritoneal CSF Shunt    

 

 Procedures not documented    







Immunization







 Name  Dates  Details

 

 Influenza  Administered on:20-Oct-2011  



 Lot #:[id number]    

 

 Influenza  Administered on:2014  



 Lot #:[id number]    

 

 Fluzone Quadrivalent 0.5 ML Intramuscular Suspension  Administered on:2016  



 Lot #:[id number]    

 

 Fluzone Quadrivalent 0.5 ML Intramuscular Suspension Prefilled Syringe  
Administered on:26-Oct-2016  



 Lot #:[id number]    







Family History

Mother





 Name  Dates  Details

 

 Family history of dementia(V17.2, Z81.8)    Status:Active

 

 Family history of depression(V17.0, Z81.8)    Status:Active



Father





 Name  Dates  Details

 

 Family history of dementia(V17.2, Z81.8)    Status:Active

 

 Family history of Alcohol dependence(303.90, F10.20)    Status:Active



Sister





 Name  Dates  Details

 

 Family history of depression(V17.0, Z81.8)    Status:Active



Brother





 Name  Dates  Details

 

 Family history of depression(V17.0, Z81.8)    Status:Active







Social History







 Name  Dates  Details

 

 -    



Smoking StatusNever smoker



Vital Signs







 Date  Test  Result  Details

 

       

 

   No Known Vitals to report    



       







Results







 Date  Description  Value  Details

 

   Results not documented    



       

 

       







Plan of Care

Planned Observations





 Name  Dates  Details

 

 Planned Goals not documented    Goal



Planned Encounters





 Appointment; Provider: Keith Perez  [id number]



   11:15









 Appointment; Provider: HUNTER CERVANTES  [id number]



   09:30







Interventions Provided

Medication ChangesDivalproex Sodium  MG Oral Tablet Extended Release 24 
Hour - Renew



Instructions

Instructions not documented



Encounters







 Appointment; Keith Perez  On26-Oct-2016



 Encounter Diagnosis:Problem not documented  10:30

 

 Appointment; HUNTER CERVANTES  On10-Oct-2016



 Encounter Diagnosis:Problem not documented  13:30

 

 Appointment; Keith Perez  



 Encounter Diagnosis:Problem not documented  14:00

 

 Appointment; Keith Perez  



 Encounter Diagnosis:Problem not documented  14:00

 

 Appointment; HUNTER CERVANTES  



 Encounter Diagnosis:Problem not documented  10:30

 

 Appointment; HUNTER CERVANTES  



 Encounter Diagnosis:Problem not documented  13:00

 

 Appointment; Keith Perez  



 Encounter Diagnosis:Problem not documented  13:00

 

 Appointment; YOLANDA MAGDALENO  



 Encounter Diagnosis:Problem not documented  11:00

 

 Appointment; Keith Perez  



 Encounter Diagnosis:Problem not documented  14:00

 

 Appointment; HAMMAD DASH  On25-Mar-2015



 Encounter Diagnosis:Problem not documented  11:00

## 2017-09-28 NOTE — XMS
Summary of Care

 Created on:2017



Patient:FCO OSMAN

Sex:Male

:1975

External Reference #:[id number]





Demographics







 [place] [address]

 

 Phone  Unavailable

 

 Preferred Language  English

 

 Marital Status  Unknown

 

 Jewish Affiliation  Unknown

 

 Race  Other Race

 

 Ethnic Group  Not  or 









Author







 Name  Marga Sellers

 

 Address  Unavailable



   Unavailable



   ,









Care Team Providers







 Name  Role  Phone

 

 Keith Perez  Unavailable  Unavailable

 

 Marga Sellers  Unavailable  Unavailable

 

 HUNTER CERVANTES  Unavailable  Unavailable

 

 CAITLYN JONES MD  Primary Care Provider  Unavailable

 

 CAITLYN DWYER  Unavailable  Unavailable

 

 ,  Unavailable  Unavailable









Functional Status

Functional Status Health Issues





 Name  Dates  Details

 

 Functional status health issues are not documented    Status:



Cognitive Status Health Issues





 Name  Dates  Details

 

 Cognitive status health issues are not documented    Status:







Problems







 Name  Dates  Details

 

 Intracranial injury, initial encounter    Status:Active

 

 Cerumen impaction(380.4, H61.20)    Status:Active

 

 Well adult on routine health check(V70.0, Z00.00)    Status:Active

 

 Influenza vaccine needed(V04.81, Z23)    Status:Active

 

 Hypothyroidism(244.9, E03.9)    Status:Active

 

 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

 

 Depressive disorder(311, F32.9)    Status:Active

 

 Behavioral problems(V40.9)    Status:Active

 

 Head injury(959.01, S09.90XA)    Status:Active

 

 Essential hypertriglyceridemia(272.1, E78.1)    Status:Active

 

 Elevated liver function tests(790.6, R94.5)    Status:Active

 

 Hyperlipidemia(272.4, E78.5)    Status:Active

 

 Pre-diabetes(790.29, R73.03)    Status:Active

 

 Need for hepatitis A and B vaccination(V05.3, Z23)    Status:Active







Medications







 Name  Dates  Details









 Divalproex Sodium  MG Oral Tablet Extended Release 24 Hour



 TAKE THREE (3) TABLETS BY MOUTH EVERY MORNING AND THREE (3) TABLETS EVERY 
EVENING.









   Quantity:180  Refills:0







HUNTER CERVANTES M.D.





 Started4-May-2010



ActiveSertraline HCl - 100 MG Oral Tablet

TAKE ONE (1) TABLET(S) BY MOUTH TWICE A DAY.







 Quantity:60  Refills:0







HUNTER CERVANTES M.D.





 Started4-May-2010



ActiveLevothyroxine Sodium 100 MCG Oral Tablet

TAKE 1 TABLET DAILY AS DIRECTED.







 Quantity:90  Refills:1







Keith Perez N.PKallie





 [id number]



ActiveRosuvastatin Calcium 10 MG Oral Tablet

TAKE ONE (1) TABLET(S) BY MOUTH ONCE A DAY.







 Quantity:90  Refills:1







Tomer Perezin N.P.





 Started4-Dec-2012



ActiveOmega-3-acid Ethyl Esters 1 GM Oral Capsule

TAKE TWO (2) CAPSULE(S) BY MOUTH TWICE A DAY.







 Quantity:360  Refills:0







Tomer Perezin N.P.





 [id number]



ActiveFenofibrate 145 MG Oral Tablet

TAKE 1 TABLET DAILY.







 Quantity:90  Refills:1







Tomer Perezin N.P.





 Started4-Dec-2012



ActiveOLANZapine 5 MG Oral Tablet

TAKE ONE (1) TABLET(S) BY MOUTH AT BEDTIME. TAKE WITH OLANZAPINE 20 MG.







 Quantity:30  Refills:0







HUNTER CERVANTES M.D.





 [id number]



ActiveOmega-3-acid Ethyl Esters 1 GM Oral Capsule

TAKE 2 CAPSULE EVERY 12 HOURS







 Quantity:360  Refills:3







Keith Perez N.PKallie





 [id number]



ActiveFenofibrate 145 MG Oral Tablet

TAKE one (1) tablet(s) by mouth every day.







 Quantity:90  Refills:3







Keith Perez N.PKallie





 Started10-Sep-2014



ActiveOLANZapine 20 MG Oral Tablet

TAKE ONE (1) TABLET(S) BY MOUTH AT BEDTIME ALONG WITH OLANZAPINE 5MG.







 Quantity:30  Refills:0







HUNTER CERVANTES M.D.





 Started24-Sep-2014



Active



Allergies and Adverse Reactions







 Name  Dates  Details

 

 No Known Drug Allergies    Status:Active







Past Medical History







 Name  Dates  Details

 

 Head injury(959.01, S09.90XA)    Status:Active

 

 History of Acute upper respiratory infection(465.9, J06.9)    Status:Resolved

 

 History of Cough(786.2, R05)    Status:Resolved

 

 History of essential hypertension(V12.59, Z86.79)    Status:Resolved

 

 History of hyperlipidemia(V12.29, Z86.39)    Status:Resolved

 

 History of hypothyroidism(V12.29, Z86.39)    Status:Resolved

 

 History of obesity(V13.89, Z86.39)    Status:Resolved







Procedures







 Procedure  Dates  Details

 

 History of Brain Surgery    

 

 History of Inguinal Hernia Repair    

 

 History of Creation Of Ventriculo-Peritoneal CSF Shunt    

 

 [[facility]] LIPID PANEL  Ordered:2017  

 

 [[facility]] CMP W/EGFR  Ordered:2017  

 

 [[facility]] HEMOGLOBIN A1c  Ordered:2017  

 

  Liver 57098  Ordered:2017  







Immunization







 Name  Dates  Details

 

 Influenza  Administered on:20-Oct-2011  



 Lot #:[id number]    

 

 Influenza  Administered on:2014  



 Lot #:[id number]    

 

 Fluzone Quadrivalent 0.5 ML Intramuscular Suspension  Administered on:2016  



 Lot #:[id number]    

 

 Fluzone Quadrivalent 0.5 ML Intramuscular Suspension Prefilled Syringe  
Administered on:26-Oct-2016  



 Lot #:[id number]    







Family History

Mother





 Name  Dates  Details

 

 Family history of dementia(V17.2, Z81.8)    Status:Active

 

 Family history of depression(V17.0, Z81.8)    Status:Active



Father





 Name  Dates  Details

 

 Family history of dementia(V17.2, Z81.8)    Status:Active

 

 Family history of Alcohol dependence(303.90, F10.20)    Status:Active



Sister





 Name  Dates  Details

 

 Family history of depression(V17.0, Z81.8)    Status:Active



Brother





 Name  Dates  Details

 

 Family history of depression(V17.0, Z81.8)    Status:Active







Social History







 Name  Dates  Details

 

 -    



Smoking StatusNever smoker



Vital Signs







 Date  Test  Result  Details

 

       

 

 2017 11:10  BP Systolic  111mm[Hg]  Status:









 BP Diastolic  74mm[Hg]  Status:

 

 Temperature  97.9f  Status:

 

 Heart Rate  77/min  Status:

 

 Physical Findings  16  Status:Comments:Respiration

 

 Height  72in  Status:

 

 Weight  242.25lb  Status:

 

 Body Mass Index Calculated  32.86kg/m2  Status:

 

 Body Surface Area Calculated  2.31m2  Status:









       

 

 2017 09:45  BP Systolic  118mm[Hg]  Status:









 BP Diastolic  76mm[Hg]  Status:

 

 Height  72in  Status:

 

 Weight  242lb  Status:

 

 Body Mass Index Calculated  32.82kg/m2  Status:

 

 Body Surface Area Calculated  2.31m2  Status:







Results







 Date  Description  Value  Details

 

   Results not documented    



       

 

       







Plan of Care

Planned Observations





 Name  Dates  Details

 

 Planned Goals not documented    Goal



Planned Encounters





 Appointment; Provider: Keith Perez  



   10:45









 Appointment; Provider: HUNTER CERVANTES  On2-May-2017



   11:00







Instructions

Instructions not documented



Encounters







 Appointment; Keith Perez  



 Encounter Diagnosis:Problem not documented  11:15

 

 Appointment; HUNTER CERVANTES  



 Encounter Diagnosis:Problem not documented  09:30

 

 Appointment; Keith Perez  



 Encounter Diagnosis:Problem not documented  11:00

 

 Appointment; Keith Perez  On26-Oct-2016



 Encounter Diagnosis:Problem not documented  10:30

 

 Appointment; HUNTER CERVANTES  On10-Oct-2016



 Encounter Diagnosis:Problem not documented  13:30

 

 Appointment; Keith Perez  



 Encounter Diagnosis:Problem not documented  14:00

 

 Appointment; Keith Perez  



 Encounter Diagnosis:Problem not documented  14:00

 

 Appointment; HUNTER CERVANTES  



 Encounter Diagnosis:Problem not documented  10:30

 

 Appointment; HUNTER CERVANTES  



 Encounter Diagnosis:Problem not documented  13:00

 

 Appointment; Keith Perez  



 Encounter Diagnosis:Problem not documented  13:00

 

 Appointment; YOLANDA MAGDALENO  



 Encounter Diagnosis:Problem not documented  11:00

 

 Appointment; Keith Perez  



 Encounter Diagnosis:Problem not documented  14:00

 

 Appointment; HAMMAD DASH  On25-Mar-2015



 Encounter Diagnosis:Problem not documented  11:00

## 2017-09-28 NOTE — XMS
Summary of Care

 Created on:2016



Patient:FCO OSMAN

Sex:Male

:1975

External Reference #:[id number]





Demographics







 Address  [address] [place] [id number]

 

 Phone  Unavailable

 

 Preferred Language  English

 

 Marital Status  Unknown

 

 Christian Affiliation  Unknown

 

 Race  White

 

 Ethnic Group  Not  or 









Author







 Name  Marga Sellers

 

 Address  Unavailable



   Unavailable



   ,









Care Team Providers







 Name  Role  Phone

 

 Keith Perez  Unavailable  Unavailable

 

 Marga Sellers  Unavailable  Unavailable

 

 HUNTER CERVANTES  Unavailable  Unavailable

 

 CAITLYN JONES MD  Primary Care Provider  Unavailable

 

 CAITLYN DWYER  Unavailable  Unavailable

 

 ,  Unavailable  Unavailable









Functional Status

Functional Status Health Issues





 Name  Dates  Details

 

 Functional status health issues are not documented    Status:



Cognitive Status Health Issues





 Name  Dates  Details

 

 Cognitive status health issues are not documented    Status:







Problems







 Name  Dates  Details

 

 Intracranial injury, initial encounter    Status:Active

 

 Cerumen impaction(380.4, H61.20)    Status:Active

 

 Hyperlipidemia(272.4, E78.5)    Status:Active

 

 Well adult on routine health check(V70.0, Z00.00)    Status:Active

 

 Influenza vaccine needed(V04.81, Z23)    Status:Active

 

 Hypothyroidism(244.9, E03.9)    Status:Active

 

 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

 

 Depressive disorder(311, F32.9)    Status:Active

 

 Behavioral problems(V40.9)    Status:Active

 

 Head injury(959.01, S09.90XA)    Status:Active

 

 Essential hypertriglyceridemia(272.1, E78.1)    Status:Active

 

 Elevated liver function tests(790.6, R79.89)    Status:Active

 

 Pre-diabetes(790.29, R73.09)    Status:Active







Medications







 Name  Dates  Details









 Divalproex Sodium  MG Oral Tablet Extended Release 24 Hour



 Take three tablets every morning and three tablets every evening.









   Quantity:180  Refills:1







HUNTER CERVANTES M.D.





 Started4-May-2010



ActiveSertraline HCl - 100 MG Oral Tablet

TAKE ONE (1) TABLET(S) BY MOUTH TWICE A DAY.







 Quantity:60  Refills:2







HUNTER CERVANTES M.D.





 Started4-May-2010



ActiveLevothyroxine Sodium 100 MCG Oral Tablet

TAKE 1 TABLET DAILY AS DIRECTED.







 Quantity:90  Refills:1







Keith Perez N.P.





 [id number]



ActiveRosuvastatin Calcium 10 MG Oral Tablet

TAKE ONE (1) TABLET(S) BY MOUTH ONCE A DAY.







 Quantity:90  Refills:1







Chris Keith N.PKallie





 Started4-Dec-2012



ActiveOmega-3-acid Ethyl Esters 1 GM Oral Capsule

TAKE 2 CAPSULES TWICE DAILY.







 Quantity:360  Refills:1







LewiswiKeith carpio N.PKallieActiveFenofibrate 145 MG Oral Tablet

TAKE 1 TABLET DAILY.







 Quantity:90  Refills:1







Sandritalias Keith N.PKallie





 Started4-Dec-2012



ActiveOLANZapine 5 MG Oral Tablet

TAKE ONE (1) TABLET(S) BY MOUTH AT BEDTIME. TAKE WITH OLANZAPINE 20 MG.







 Quantity:30  Refills:2







HUNTER CERVANTES M.D.





 [id number]



ActiveOmega-3-acid Ethyl Esters 1 GM Oral Capsule

TAKE 2 CAPSULE EVERY 12 HOURS







 Quantity:360  Refills:3







Chris Keith NWILNER





 [id number]



ActiveFenofibrate 145 MG Oral Tablet

TAKE one (1) tablet(s) by mouth every day.







 Quantity:90  Refills:0







Lewisantonio Keith NWILNER





 Started10-Sep-2014



ActiveOLANZapine 20 MG Oral Tablet

TAKE ONE (1) TABLET(S) BY MOUTH AT BEDTIME ALONG WITH OLANZAPINE 5MG.







 Quantity:30  Refills:2







HUNTER CERVANTES M.D.





 Started24-Sep-2014



Active



Allergies and Adverse Reactions







 Name  Dates  Details

 

 No Known Drug Allergies    Status:Active







Past Medical History







 Name  Dates  Details

 

 Head injury(959.01, S09.90XA)    Status:Active

 

 History of Acute upper respiratory infection(465.9, J06.9)    Status:Resolved

 

 History of Cough(786.2, R05)    Status:Resolved

 

 History of essential hypertension(V12.59, Z86.79)    Status:Resolved

 

 History of hyperlipidemia(V12.29, Z86.39)    Status:Resolved

 

 History of hypothyroidism(V12.29, Z86.39)    Status:Resolved

 

 History of obesity(V13.89, Z86.39)    Status:Resolved







Procedures







 Procedure  Dates  Details

 

 History of Brain Surgery    

 

 History of Inguinal Hernia Repair    

 

 History of Creation Of Ventriculo-Peritoneal CSF Shunt    

 

 [[facility]] VALPROIC ACID  Pending:10-Oct-2016  

 

 [QL] COMPREHENSIVE METABOLIC PANEL W/O eGFR  Pending:10-Oct-2016  

 

 [[facility]] LIPID PANEL  Pending:10-Oct-2016  

 

 [[facility]] HEMOGLOBIN A1c  Pending:10-Oct-2016  

 

 [[facility]] LIPID PANEL  Ordered:26-Oct-2016  

 

 [[facility]] CMP W/EGFR  Ordered:26-Oct-2016  

 

 [[facility]] HEMOGLOBIN A1c  Ordered:26-Oct-2016  

 

 [[facility]] HEPATITIS B SURFACE ANTIBODY (QUANT)  Ordered:26-Oct-2016  

 

 [QL] HEPATITIS A AB, TOTAL  Ordered:26-Oct-2016  

 

 [[facility]] HEPATITIS PANEL  Ordered:26-Oct-2016  

 

  Liver [id number]  Ordered:26-Oct-2016  







Immunization







 Name  Dates  Details

 

 Influenza  Administered on:20-Oct-2011  



 Lot #:[id number]    

 

 Influenza  Administered on:2014  



 Lot #:[id number]    

 

 Fluzone Quadrivalent 0.5 ML Intramuscular Suspension  Administered on:2016  



 Lot #:[id number]    

 

 Fluzone Quadrivalent 0.5 ML Intramuscular Suspension Prefilled Syringe  
Administered on:26-Oct-2016  



 Lot #:[id number]    







Family History

Mother





 Name  Dates  Details

 

 Family history of dementia(V17.2, Z81.8)    Status:Active

 

 Family history of depression(V17.0, Z81.8)    Status:Active



Father





 Name  Dates  Details

 

 Family history of dementia(V17.2, Z81.8)    Status:Active

 

 Family history of Alcohol dependence(303.90, F10.20)    Status:Active



Sister





 Name  Dates  Details

 

 Family history of depression(V17.0, Z81.8)    Status:Active



Brother





 Name  Dates  Details

 

 Family history of depression(V17.0, Z81.8)    Status:Active







Social History







 Name  Dates  Details

 

 -    



Smoking StatusNever smoker



Vital Signs







 Date  Test  Result  Details

 

       

 

 26-Oct-2016 11:10  BP Systolic  122mm[Hg]  Status:









 BP Diastolic  77mm[Hg]  Status:

 

 Temperature  97.7f  Status:

 

 Heart Rate  76/min  Status:

 

 Physical Findings  16  Status:Comments:Respiration

 

 Height  72in  Status:

 

 Weight  256lb  Status:

 

 Body Mass Index Calculated  34.72kg/m2  Status:

 

 Body Surface Area Calculated  2.37m2  Status:









       

 

 10-Oct-2016 12:53  BP Systolic  116mm[Hg]  Status:









 BP Diastolic  70mm[Hg]  Status:

 

 Heart Rate  69/min  Status:

 

 Height  72in  Status:

 

 Weight  256lb  Status:

 

 Body Mass Index Calculated  34.72kg/m2  Status:

 

 Body Surface Area Calculated  2.37m2  Status:







Results







 Date  Description  Value  Details

 

 12-Oct-2016 09:20  [QL] COMPREHENSIVE    



   METABOLIC PANEL W/O eGFR    

 

   Glucose, Serum  78  mg/dL  Range: 65-99

 

   BUN  12  mg/dL  Range: 6-24

 

   Creatine, Serum  0.82  mg/dL  Range: 0.76-1.27

 

   eGFR If NonAfricn Am  109  mL/min/1.7  Range: >59

 

   eGFR If Africn Am  126  mL/min/1.7  Range: >59

 

   BUN/Creatine Ratio  15  Range: 9-20

 

   Sodium, Serum  144  mmol/L  Range: 134-144



       Comments:**Effective 2016 the reference interval**          
       for Sodium, Serum will be changing to:                                  
                         136 - 144

 

   Potassium, Serum  4.6  mmol/L  Range: 3.5-5.2



       Comments:**Effective 2016 the reference interval**          
       for Potassium, Serum will be changing to:                               
         0 -  7 days        3.7 - 5.2



       8 - 30 days        3.7 - 6.4                                        1 -  
6 months      3.8 - 6.0                                 7 months -  1 year     
   3.8 - 5.3>1 year        3.5 - 5.2

 

   Chloride, Serum  99  mmol/L  Range: 



       Comments:**Effective 2016 the reference interval**          
       for Chloride, Serum will be changing to:                                
                            97 - 106

 

   Carbon Dioxide, Total  28  mmol/L  Range: 18-29

 

   Calcium, Serum  9.7  mg/dL  Range: 8.7-10.2

 

   Protein, Total, Serum  6.8  g/dL  Range: 6.0-8.5

 

   Albumin, Serum  4.3  g/dL  Range: 3.5-5.5

 

   Globulin, Total  2.5  g/dL  Range: 1.5-4.5

 

   A/G Ratio  1.7  Range: 1.1-2.5

 

   Bilirubin, Total  0.3  mg/dL  Range: 0.0-1.2

 

   Alkaline Phosphatase, S  50  IU/L  Range: 

 

   AST (SGOT)  50  IU/L (Above  Range: 0-40



     high threshold)  

 

   ALT (SGPT)  52  IU/L (Above  Range: 0-44



     high threshold)  

 

 09:20  [QL] LIPID PANEL    

 

   Cholesterol, Total  153  mg/dL  Range: 100-199

 

   Triglycerides  243  mg/dL (Above  Range: 0-149



     high threshold)  

 

   HDL Cholesterol  21  mg/dL (Below  Range: >39



     low threshold)  Comments:According to ATP-III Guidelines, HDL-C >59 mg/dL 
is considered anegative risk factor for CHD.

 

   VLDL Cholesterol Cheikh  49  mg/dL (Above  Range: 5-40



     high threshold)  

 

   LDL Cholesterol Calc  83  mg/dL  Range: 0-99

 

   Comment:    



       

 

   LDL/HDL Ratio  4.0  ratio unit  Range: 0.0-3.6



     (Above high  Comments:LDL/HDL Ratio                                       
                    Men  Women                                             1/2 
Avg.Risk  1.0    1.5



     threshold)  Avg.Risk  3.6    3.2                                          
    2X Avg.Risk  6.2    5.0                                              3X 
Avg.Risk  8.0    6.1

 

 09:20  [[facility]] HEMOGLOBIN A1c    

 

   Hemoglobin A1c  5.9  % (Above high  Range: 4.8-5.6



     threshold)  Comments:.         Pre-diabetes: 5.7 - 6.4         Diabetes: >
6.4         Glycemic control for adults with diabetes: <7.0

 

 09:20  [QL] VALPROIC ACID    

 

   Valproic Acid (Depakote),S  90  ug/mL  Range: 



       Comments:Detection Limit=4                                          <4 
indicates None Detected                                                        
           .               Toxicity may occur at levels



       of 100-500. Measurements               of free unbound valproic acid may 
improve the assess-               ment of clinical response.







Plan of Care

Planned Observations





 Name  Dates  Details

 

 Planned Goals not documented    Goal



Planned Encounters





 Appointment; Provider: Keith Perez  



   11:00







Instructions

Instructions not documented



Encounters







 Appointment; Keith Perez  On26-Oct-2016



 Encounter Diagnosis:Problem not documented  10:30

 

 Appointment; HUNTER CERVANTES  On10-Oct-2016



 Encounter Diagnosis:Problem not documented  13:30

 

 Appointment; Keith Perez  



 Encounter Diagnosis:Problem not documented  14:00

 

 Appointment; Keith Perez  



 Encounter Diagnosis:Problem not documented  14:00

 

 Appointment; HUNTER CERVANTES  



 Encounter Diagnosis:Problem not documented  10:30

 

 Appointment; HUNTER CERVANTES  



 Encounter Diagnosis:Problem not documented  13:00

 

 Appointment; Keith Perez  



 Encounter Diagnosis:Problem not documented  13:00

 

 Appointment; YOLANDA MAGDALENO  



 Encounter Diagnosis:Problem not documented  11:00

 

 Appointment; Keith Perez  



 Encounter Diagnosis:Problem not documented  14:00

 

 Appointment; HAMMAD DASH  On25-Mar-2015



 Encounter Diagnosis:Problem not documented  11:00

 

 Appointment; MATT LAWRENCE  



 Encounter Diagnosis:Problem not documented  10:30

## 2017-09-28 NOTE — XMS
Summary of Care

 Created on:2016



Patient:FCO OSMAN

Sex:Male

:1975

External Reference #:[id number]





Demographics







 Address  [address] [place] [address]

 

 Phone  Unavailable

 

 Preferred Language  English

 

 Marital Status  Unknown

 

 Rastafari Affiliation  Unknown

 

 Race  Other Race

 

 Ethnic Group  Not  or 









Author







 Name  Grace Mariee

 

 Address  Unavailable



   Unavailable



   ,









Care Team Providers







 Name  Role  Phone

 

 Keith Perez  Unavailable  Unavailable

 

 Grace Mariee  Unavailable  Unavailable

 

 HUNTER CERVANTES  Unavailable  Unavailable

 

 CAITLYN JONES MD  Primary Care Provider  Unavailable

 

 CAITLYN DWYER  Unavailable  Unavailable

 

 ,  Unavailable  Unavailable









Functional Status

Functional Status Health Issues





 Name  Dates  Details

 

 Functional status health issues are not documented    Status:



Cognitive Status Health Issues





 Name  Dates  Details

 

 Cognitive status health issues are not documented    Status:







Problems







 Name  Dates  Details

 

 Intracranial injury, initial encounter    Status:Active

 

 Cerumen impaction(380.4, H61.20)    Status:Active

 

 Hyperlipidemia(272.4, E78.5)    Status:Active

 

 Well adult on routine health check(V70.0, Z00.00)    Status:Active

 

 Influenza vaccine needed(V04.81, Z23)    Status:Active

 

 Hypothyroidism(244.9, E03.9)    Status:Active

 

 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

 

 Depressive disorder(311, F32.9)    Status:Active

 

 Behavioral problems(V40.9)    Status:Active

 

 Head injury(959.01, S09.90XA)    Status:Active

 

 Essential hypertriglyceridemia(272.1, E78.1)    Status:Active

 

 Elevated liver function tests(790.6, R79.89)    Status:Active

 

 Pre-diabetes(790.29, R73.09)    Status:Active







Medications







 Name  Dates  Details









 Divalproex Sodium  MG Oral Tablet Extended Release 24 Hour



 Take three tablets every morning and three tablets every evening.









   Quantity:180  Refills:1







HUNTER CERVANTES M.D.





 Started4-May-2010



ActiveSertraline HCl - 100 MG Oral Tablet

TAKE ONE (1) TABLET(S) BY MOUTH TWICE A DAY.







 Quantity:60  Refills:2







HUNTER CERVANTES M.D.





 Started4-May-2010



ActiveLevothyroxine Sodium 100 MCG Oral Tablet

TAKE 1 TABLET DAILY AS DIRECTED.







 Quantity:90  Refills:1







HardwiKeith carpio N.P.





 [id number]



ActiveRosuvastatin Calcium 10 MG Oral Tablet

TAKE ONE (1) TABLET(S) BY MOUTH ONCE A DAY.







 Quantity:90  Refills:1







LewiswimyeshalisaKeith N.JONATHON





 Started4-Dec-2012



ActiveOmega-3-acid Ethyl Esters 1 GM Oral Capsule

TAKE 2 CAPSULES TWICE DAILY.







 Quantity:360  Refills:1







Keith Perez N.PKallieActiveFenofibrate 145 MG Oral Tablet

TAKE 1 TABLET DAILY.







 Quantity:90  Refills:1







LewiswiKeith carpio NKalliePKallie





 Started4-Dec-2012



ActiveOLANZapine 5 MG Oral Tablet

TAKE ONE (1) TABLET(S) BY MOUTH AT BEDTIME. TAKE WITH OLANZAPINE 20 MG.







 Quantity:30  Refills:2







HUNTER CERVANTES M.D.





 [id number]



ActiveOmega-3-acid Ethyl Esters 1 GM Oral Capsule

TAKE 2 CAPSULE EVERY 12 HOURS







 Quantity:360  Refills:3







Keith Perez N.P.





 [id number]



ActiveFenofibrate 145 MG Oral Tablet

TAKE one (1) tablet(s) by mouth every day.







 Quantity:90  Refills:3







Keith Perez NWILNER





 Started10-Sep-2014



ActiveOLANZapine 20 MG Oral Tablet

TAKE ONE (1) TABLET(S) BY MOUTH AT BEDTIME ALONG WITH OLANZAPINE 5MG.







 Quantity:30  Refills:2







HUNTER CERVANTES M.D.





 Started24-Sep-2014



Active



Allergies and Adverse Reactions







 Name  Dates  Details

 

 No Known Drug Allergies    Status:Active







Past Medical History







 Name  Dates  Details

 

 Head injury(959.01, S09.90XA)    Status:Active

 

 History of Acute upper respiratory infection(465.9, J06.9)    Status:Resolved

 

 History of Cough(786.2, R05)    Status:Resolved

 

 History of essential hypertension(V12.59, Z86.79)    Status:Resolved

 

 History of hyperlipidemia(V12.29, Z86.39)    Status:Resolved

 

 History of hypothyroidism(V12.29, Z86.39)    Status:Resolved

 

 History of obesity(V13.89, Z86.39)    Status:Resolved







Procedures







 Procedure  Dates  Details

 

 History of Brain Surgery    

 

 History of Inguinal Hernia Repair    

 

 History of Creation Of Ventriculo-Peritoneal CSF Shunt    

 

 [[facility]] LIPID PANEL  Ordered:26-Oct-2016  

 

 [[facility]] CMP W/EGFR  Ordered:26-Oct-2016  

 

 [[facility]] HEMOGLOBIN A1c  Ordered:26-Oct-2016  

 

 [[facility]] HEPATITIS B SURFACE ANTIBODY (QUANT)  Ordered:26-Oct-2016  

 

 [[facility]] HEPATITIS A AB, TOTAL  Ordered:26-Oct-2016  

 

 [[facility]] HEPATITIS PANEL  Ordered:26-Oct-2016  

 

  Liver [id number]  Ordered:26-Oct-2016  







Immunization







 Name  Dates  Details

 

 Influenza  Administered on:20-Oct-2011  



 Lot #:[id number]    

 

 Influenza  Administered on:2014  



 Lot #:[id number]    

 

 Fluzone Quadrivalent 0.5 ML Intramuscular Suspension  Administered on:2016  



 Lot #:[id number]    

 

 Fluzone Quadrivalent 0.5 ML Intramuscular Suspension Prefilled Syringe  
Administered on:26-Oct-2016  



 Lot #:[id number]    







Family History

Mother





 Name  Dates  Details

 

 Family history of dementia(V17.2, Z81.8)    Status:Active

 

 Family history of depression(V17.0, Z81.8)    Status:Active



Father





 Name  Dates  Details

 

 Family history of dementia(V17.2, Z81.8)    Status:Active

 

 Family history of Alcohol dependence(303.90, F10.20)    Status:Active



Sister





 Name  Dates  Details

 

 Family history of depression(V17.0, Z81.8)    Status:Active



Brother





 Name  Dates  Details

 

 Family history of depression(V17.0, Z81.8)    Status:Active







Social History







 Name  Dates  Details

 

 -    



Smoking StatusNever smoker



Vital Signs







 Date  Test  Result  Details

 

       

 

   No Known Vitals to report    



       







Results







 Date  Description  Value  Details

 

   Results not documented    



       

 

       







Plan of Care

Planned Observations





 Name  Dates  Details

 

 Planned Goals not documented    Goal







Interventions Provided

Medication ChangesFenofibrate 145 MG Oral Tablet - Renew



Instructions

Instructions not documented



Encounters







 Appointment; Keith Perez  On26-Oct-2016



 Encounter Diagnosis:Problem not documented  10:30

 

 Appointment; HUNTER CERVANTES  On10-Oct-2016



 Encounter Diagnosis:Problem not documented  13:30

 

 Appointment; Keith Perez  [id number]



 Encounter Diagnosis:Problem not documented  14:00

 

 Appointment; Keith Perez  [id number]



 Encounter Diagnosis:Problem not documented  14:00

 

 Appointment; HUNTER CERVANTES  [id number]



 Encounter Diagnosis:Problem not documented  10:30

 

 Appointment; HUNTER CERVANTES  



 Encounter Diagnosis:Problem not documented  13:00

 

 Appointment; Keith Perez  



 Encounter Diagnosis:Problem not documented  13:00

 

 Appointment; YOLANDA MAGDALENO  



 Encounter Diagnosis:Problem not documented  11:00

 

 Appointment; Keith Perez  



 Encounter Diagnosis:Problem not documented  14:00

 

 Appointment; HAMMAD DASH  On25-Mar-2015



 Encounter Diagnosis:Problem not documented  11:00

## 2017-09-28 NOTE — XMS
Summary of Care

 Created on:2017



Patient:FCO OSMAN

Sex:Male

:1975

External Reference #:[id number]





Demographics







 Address  [address] [place] [address]

 

 Phone  Unavailable

 

 Preferred Language  Unknown

 

 Marital Status  Unknown

 

 [employer] Affiliation  Unknown

 

 Race  White

 

 Ethnic Group  Not  or 









Author







 Name  Keith Perez

 

 Address  Unavailable



   Unavailable



   ,









Care Team Providers







 Name  Role  Phone

 

 Keith Perez  Unavailable  Unavailable

 

 HUNTER CERVANTES  Unavailable  Unavailable

 

 CAITLYN JONES MD  Primary Care Provider  Unavailable

 

 CAITLYN DWYER  Unavailable  Unavailable

 

 ,  Unavailable  Unavailable









Functional Status

Functional Status Health Issues





 Name  Dates  Details

 

 Functional status health issues are not documented    Status:



Cognitive Status Health Issues





 Name  Dates  Details

 

 Cognitive status health issues are not documented    Status:







Problems







 Name  Dates  Details

 

 Intracranial injury, initial encounter    Status:Active

 

 Cerumen impaction(380.4, H61.20)    Status:Active

 

 Well adult on routine health check(V70.0, Z00.00)    Status:Active

 

 Influenza vaccine needed(V04.81, Z23)    Status:Active

 

 Hypothyroidism(244.9, E03.9)    Status:Active

 

 Head injury(959.01, S09.90XA)    Status:Active

 

 Essential hypertriglyceridemia(272.1, E78.1)    Status:Active

 

 Elevated liver function tests(790.6, R94.5)    Status:Active

 

 Hyperlipidemia(272.4, E78.5)    Status:Active

 

 Pre-diabetes(790.29, R73.03)    Status:Active

 

 Need for hepatitis A and B vaccination(V05.3, Z23)    Status:Active

 

 Behavioral problems(V40.9)    Status:Active

 

 Depressive disorder(311, F32.9)    Status:Active

 

 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







Medications







 Name  Dates  Details









 Divalproex Sodium  MG Oral Tablet Extended Release 24 Hour



 TAKE THREE (3) TABLETS BY MOUTH EVERY MORNING AND THREE (3) TABLETS EVERY 
EVENING.









   Quantity:540  Refills:0







HUNTER CERVANTES M.D.





 Started4-May-2010



ActiveSertraline HCl - 100 MG Oral Tablet

TAKE ONE (1) TABLET(S) BY MOUTH TWICE A DAY.







 Quantity:180  Refills:0







HUNTER CERVANTES M.D.





 Started4-May-2010



ActiveLevothyroxine Sodium 100 MCG Oral Tablet

TAKE 1 TABLET DAILY AS DIRECTED.







 Quantity:90  Refills:1







Lewiswimyeshalisa Keith N.JONATHON





 [id number]



ActiveRosuvastatin Calcium 10 MG Oral Tablet

TAKE ONE (1) TABLET(S) BY MOUTH ONCE A DAY.







 Quantity:90  Refills:3







Chris Keith N.PKallie





 Started4-Dec-2012



ActiveOmega-3-acid Ethyl Esters 1 GM Oral Capsule

TAKE TWO (2) CAPSULE(S) BY MOUTH TWICE A DAY.







 Quantity:360  Refills:0







Keith Perez N.JONATHON





 [id number]



ActiveFenofibrate 145 MG Oral Tablet

TAKE 1 TABLET DAILY.







 Quantity:90  Refills:1







LewiswiKeith carpio N.P.





 Started4-Dec-2012



ActiveOLANZapine 5 MG Oral Tablet

TAKE ONE (1) TABLET(S) BY MOUTH AT BEDTIME. TAKE WITH OLANZAPINE 20 MG.







 Quantity:90  Refills:0







HUNTER CERVANTES M.D.





 [id number]



ActiveOmega-3-acid Ethyl Esters 1 GM Oral Capsule

TAKE 2 CAPSULE EVERY 12 HOURS







 Quantity:360  Refills:3







Lewiswibalaji Keith N.PKallie





 [id number]



ActiveFenofibrate 145 MG Oral Tablet

TAKE one (1) tablet(s) by mouth every day.







 Quantity:90  Refills:3







LewiswiKeith carpio N.JONATHON





 Started10-Sep-2014



ActiveOLANZapine 20 MG Oral Tablet

TAKE ONE (1) TABLET(S) BY MOUTH AT BEDTIME ALONG WITH OLANZAPINE 5MG.







 Quantity:90  Refills:0







HUNTER CERVANTES M.D.





 Started24-Sep-2014



Active



Allergies and Adverse Reactions







 Name  Dates  Details

 

 No Known Drug Allergies    Status:Active







Past Medical History







 Name  Dates  Details

 

 Head injury(959.01, S09.90XA)    Status:Active

 

 History of Acute upper respiratory infection(465.9, J06.9)    Status:Resolved

 

 History of Cough(786.2, R05)    Status:Resolved

 

 History of essential hypertension(V12.59, Z86.79)    Status:Resolved

 

 History of hyperlipidemia(V12.29, Z86.39)    Status:Resolved

 

 History of hypothyroidism(V12.29, Z86.39)    Status:Resolved

 

 History of obesity(V13.89, Z86.39)    Status:Resolved







Procedures







 Procedure  Dates  Details

 

 History of Brain Surgery    

 

 History of Inguinal Hernia Repair    

 

 History of Creation Of Ventriculo-Peritoneal CSF Shunt    

 

 [[facility]] LIPID PANEL  Ordered:2017  

 

 [[facility]] CMP W/EGFR  Ordered:2017  

 

 [[facility]] HEMOGLOBIN A1c  Ordered:2017  

 

  Liver [id number]  Ordered:2017  







Immunization







 Name  Dates  Details

 

 Influenza  Administered on:20-Oct-2011  



 Lot #:[id number]    

 

 Influenza  Administered on:2014  



 Lot #:[id number]    

 

 Fluzone Quadrivalent 0.5 ML Intramuscular Suspension  Administered on:2016  



 Lot #:[id number]    

 

 Fluzone Quadrivalent 0.5 ML Intramuscular Suspension Prefilled Syringe  
Administered on:26-Oct-2016  



 Lot #:[id number]    







Family History

Mother





 Name  Dates  Details

 

 Family history of dementia(V17.2, Z81.8)    Status:Active

 

 Family history of depression(V17.0, Z81.8)    Status:Active



Father





 Name  Dates  Details

 

 Family history of dementia(V17.2, Z81.8)    Status:Active

 

 Family history of Alcohol dependence(303.90, F10.20)    Status:Active



Sister





 Name  Dates  Details

 

 Family history of depression(V17.0, Z81.8)    Status:Active



Brother





 Name  Dates  Details

 

 Family history of depression(V17.0, Z81.8)    Status:Active







Social History







 Name  Dates  Details

 

 -    



Smoking StatusNever smoker



Vital Signs







 Date  Test  Result  Details

 

       

 

   No Known Vitals to report    



       







Results







 Date  Description  Value  Details

 

 15-Mar-2017 11:36  [[facility]] CMP W/EGFR    

 

   Glucose, Serum  84  mg/dL  Range: 65-99

 

   BUN  7  mg/dL  Range: 6-24

 

   Creatine, Serum  0.66  mg/dL (Below  Range: 0.76-1.27



     low threshold)  

 

   eGFR If NonAfricn Am  119  mL/min/1.7  Range: >59

 

   eGFR If Africn Am  138  mL/min/1.7  Range: >59

 

   BUN/Creatine Ratio  11  Range: 9-20

 

   Sodium, Serum  138  mmol/L  Range: 134-144

 

   Potassium, Serum  4.1  mmol/L  Range: 3.5-5.2

 

   Chloride, Serum  94  mmol/L (Below  Range: 



     low threshold)  

 

   Carbon Dioxide, Total  27  mmol/L  Range: 18-29

 

   Calcium, Serum  9.5  mg/dL  Range: 8.7-10.2

 

   Protein, Total, Serum  6.7  g/dL  Range: 6.0-8.5

 

   Albumin, Serum  4.1  g/dL  Range: 3.5-5.5

 

   Globulin, Total  2.6  g/dL  Range: 1.5-4.5

 

   A/G Ratio  1.6  Range: 1.2-2.2



       Comments:**Please note reference interval change**

 

   Bilirubin, Total  0.3  mg/dL  Range: 0.0-1.2

 

   Alkaline Phosphatase, S  44  IU/L  Range: 

 

   AST (SGOT)  37  IU/L  Range: 0-40

 

   ALT (SGPT)  34  IU/L  Range: 0-44

 

 11:36  [[facility]] LIPID PANEL    

 

   Cholesterol, Total  137  mg/dL  Range: 100-199

 

   Triglycerides  210  mg/dL (Above  Range: 0-149



     high threshold)  

 

   HDL Cholesterol  28  mg/dL (Below low  Range: >39



     threshold)  

 

   VLDL Cholesterol Cheikh  42  mg/dL (Above  Range: 5-40



     high threshold)  

 

   LDL Cholesterol Calc  67  mg/dL  Range: 0-99

 

   Comment:    



       

 

   LDL/HDL Ratio  2.4  ratio unit  Range: 0.0-3.6



       Comments:LDL/HDL Ratio                                                  
         Men  Women                                             1/2 Avg.Risk  
1.0    1.5



       Avg.Risk  3.6    3.2                                              2X 
Avg.Risk  6.2    5.0                                              3X Avg.Risk  
8.0    6.1

 

 11:36  [[facility]] HEMOGLOBIN A1c    

 

   Hemoglobin A1c  5.6  %  Range: 4.8-5.6



       Comments:.         Pre-diabetes: 5.7 - 6.4         Diabetes: >6.4       
  Glycemic control for adults with diabetes: <7.0







Plan of Care

Planned Observations





 Name  Dates  Details

 

 Planned Goals not documented    Goal



Planned Encounters





 Appointment; Provider: Keith Perez  [id number]



   10:45







Instructions

Instructions not documented



Encounters







 Appointment; Keith Perez  



 Encounter Diagnosis:Problem not documented  11:15

 

 Appointment; HUNTER CERVANTES  



 Encounter Diagnosis:Problem not documented  09:30

 

 Appointment; Keith Perez  



 Encounter Diagnosis:Problem not documented  11:00

 

 Appointment; Keith Perez  On26-Oct-2016



 Encounter Diagnosis:Problem not documented  10:30

 

 Appointment; HUNTER CERVANTES  On10-Oct-2016



 Encounter Diagnosis:Problem not documented  13:30

 

 Appointment; Keith Perez  



 Encounter Diagnosis:Problem not documented  14:00

 

 Appointment; Keith Perez  



 Encounter Diagnosis:Problem not documented  14:00

 

 Appointment; HUNTER CERVANTES  



 Encounter Diagnosis:Problem not documented  10:30

 

 Appointment; HUNTER CERVANTES  



 Encounter Diagnosis:Problem not documented  13:00

 

 Appointment; Keith Perez  



 Encounter Diagnosis:Problem not documented  13:00

 

 Appointment; YOLANDA MAGDALENO  



 Encounter Diagnosis:Problem not documented  11:00

 

 Appointment; Keith Perez  



 Encounter Diagnosis:Problem not documented  14:00

 

 Appointment; HAMMAD DASH  On25-Mar-2015



 Encounter Diagnosis:Problem not documented  11:00

## 2017-09-28 NOTE — XMS
Summary of Care

 Created on:2016



Patient:FCO OSMAN

Sex:Male

:1975

External Reference #:[id number]





Demographics







 Address  [address] [address]

 

 Phone  Unavailable

 

 Preferred Language  English

 

 Marital Status  Unknown

 

 [employer] Affiliation  Unknown

 

 Race  White

 

 Ethnic Group  Not  or 









Author







 Name  Chris Keith

 

 Address  Unavailable



   Unavailable



   ,









Care Team Providers







 Name  Role  Phone

 

 Chris Keith  Unavailable  Unavailable

 

 HUNTER CERVANTES  Unavailable  Unavailable

 

 CAITLYN JONES MD  Primary Care Provider  Unavailable

 

 CAITLYN DWYER  Unavailable  Unavailable

 

 ,  Unavailable  Unavailable









Functional Status

Functional Status Health Issues





 Name  Dates  Details

 

 Functional status health issues are not documented    Status:



Cognitive Status Health Issues





 Name  Dates  Details

 

 Cognitive status health issues are not documented    Status:







Problems







 Name  Dates  Details

 

 Intracranial injury, initial encounter    Status:Active

 

 Cerumen impaction(380.4, H61.20)    Status:Active

 

 Hyperlipidemia(272.4, E78.5)    Status:Active

 

 Well adult on routine health check(V70.0, Z00.00)    Status:Active

 

 Influenza vaccine needed(V04.81, Z23)    Status:Active

 

 Hypothyroidism(244.9, E03.9)    Status:Active

 

 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

 

 Depressive disorder(311, F32.9)    Status:Active

 

 Behavioral problems(V40.9)    Status:Active

 

 Head injury(959.01, S09.90XA)    Status:Active

 

 Essential hypertriglyceridemia(272.1, E78.1)    Status:Active

 

 Elevated liver function tests(790.6, R79.89)    Status:Active

 

 Pre-diabetes(790.29, R73.09)    Status:Active







Medications







 Name  Dates  Details









 Divalproex Sodium  MG Oral Tablet Extended Release 24 Hour



 Take three tablets every morning and three tablets every evening.









   Quantity:180  Refills:1







HUNTRE CERVANTES M.D.





 Started4-May-2010



ActiveSertraline HCl - 100 MG Oral Tablet

TAKE ONE (1) TABLET(S) BY MOUTH TWICE A DAY.







 Quantity:60  Refills:2







HUNTER CERVANTES M.D.





 Started4-May-2010



ActiveLevothyroxine Sodium 100 MCG Oral Tablet

TAKE 1 TABLET DAILY AS DIRECTED.







 Quantity:90  Refills:1







Keith Perez N.P.





 [id number]



ActiveRosuvastatin Calcium 10 MG Oral Tablet

TAKE ONE (1) TABLET(S) BY MOUTH ONCE A DAY.







 Quantity:90  Refills:1







Chris Keith N.PKallie





 Started4-Dec-2012



ActiveOmega-3-acid Ethyl Esters 1 GM Oral Capsule

TAKE 2 CAPSULES TWICE DAILY.







 Quantity:360  Refills:1







LewiswiTomer carpioin N.P.ActiveFenofibrate 145 MG Oral Tablet

TAKE 1 TABLET DAILY.







 Quantity:90  Refills:1







ChrisTomerin N.P.





 Started4-Dec-2012



ActiveOLANZapine 5 MG Oral Tablet

TAKE ONE (1) TABLET(S) BY MOUTH AT BEDTIME. TAKE WITH OLANZAPINE 20 MG.







 Quantity:30  Refills:2







HUNTER CERVANTES M.D.





 [id number]



ActiveOmega-3-acid Ethyl Esters 1 GM Oral Capsule

TAKE 2 CAPSULE EVERY 12 HOURS







 Quantity:360  Refills:3







LewiswiKeith carpio N.PKallie





 [id number]



ActiveFenofibrate 145 MG Oral Tablet

TAKE one (1) tablet(s) by mouth every day.







 Quantity:90  Refills:0







Lewisantonio Keith N.PKallie





 Started10-Sep-2014



ActiveOLANZapine 20 MG Oral Tablet

TAKE ONE (1) TABLET(S) BY MOUTH AT BEDTIME ALONG WITH OLANZAPINE 5MG.







 Quantity:30  Refills:2







HUNTER CERVANTES M.D.





 Started24-Sep-2014



Active



Allergies and Adverse Reactions







 Name  Dates  Details

 

 No Known Drug Allergies    Status:Active







Past Medical History







 Name  Dates  Details

 

 Head injury(959.01, S09.90XA)    Status:Active

 

 History of Acute upper respiratory infection(465.9, J06.9)    Status:Resolved

 

 History of Cough(786.2, R05)    Status:Resolved

 

 History of essential hypertension(V12.59, Z86.79)    Status:Resolved

 

 History of hyperlipidemia(V12.29, Z86.39)    Status:Resolved

 

 History of hypothyroidism(V12.29, Z86.39)    Status:Resolved

 

 History of obesity(V13.89, Z86.39)    Status:Resolved







Procedures







 Procedure  Dates  Details

 

 History of Brain Surgery    

 

 History of Inguinal Hernia Repair    

 

 History of Creation Of Ventriculo-Peritoneal CSF Shunt    

 

 [[facility]] VALPROIC ACID  Pending:10-Oct-2016  

 

 [[facility]] COMPREHENSIVE METABOLIC PANEL W/O eGFR  Pending:10-Oct-2016  

 

 [[facility]] LIPID PANEL  Pending:10-Oct-2016  

 

 [[facility]] HEMOGLOBIN A1c  Pending:10-Oct-2016  

 

 [[facility]] LIPID PANEL  Ordered:26-Oct-2016  

 

 [[facility]] CMP W/EGFR  Ordered:26-Oct-2016  

 

 [[facility]] HEMOGLOBIN A1c  Ordered:26-Oct-2016  

 

 [[facility]] HEPATITIS B SURFACE ANTIBODY (QUANT)  Ordered:26-Oct-2016  

 

 [[facility]] HEPATITIS A AB, TOTAL  Ordered:26-Oct-2016  

 

 [[facility]] HEPATITIS PANEL  Ordered:26-Oct-2016  

 

  Liver [id number]  Ordered:26-Oct-2016  







Immunization







 Name  Dates  Details

 

 Influenza  Administered on:20-Oct-2011  



 Lot #:[id number]    

 

 Influenza  Administered on:2014  



 Lot #:[id number]    

 

 Fluzone Quadrivalent 0.5 ML Intramuscular Suspension  Administered on:2016  



 Lot #:[id number]    

 

 Fluzone Quadrivalent 0.5 ML Intramuscular Suspension Prefilled Syringe  
Administered on:26-Oct-2016  



 Lot #:[id number]    







Family History

Mother





 Name  Dates  Details

 

 Family history of dementia(V17.2, Z81.8)    Status:Active

 

 Family history of depression(V17.0, Z81.8)    Status:Active



Father





 Name  Dates  Details

 

 Family history of dementia(V17.2, Z81.8)    Status:Active

 

 Family history of Alcohol dependence(303.90, F10.20)    Status:Active



Sister





 Name  Dates  Details

 

 Family history of depression(V17.0, Z81.8)    Status:Active



Brother





 Name  Dates  Details

 

 Family history of depression(V17.0, Z81.8)    Status:Active







Social History







 Name  Dates  Details

 

 -    



Smoking StatusNever smoker



Vital Signs







 Date  Test  Result  Details

 

       

 

 26-Oct-2016 11:10  BP Systolic  122mm[Hg]  Status:









 BP Diastolic  77mm[Hg]  Status:

 

 Temperature  97.7f  Status:

 

 Heart Rate  76/min  Status:

 

 Physical Findings  16  Status:Comments:Respiration

 

 Height  72in  Status:

 

 Weight  256lb  Status:

 

 Body Mass Index Calculated  34.72kg/m2  Status:

 

 Body Surface Area Calculated  2.37m2  Status:









       

 

 10-Oct-2016 12:53  BP Systolic  116mm[Hg]  Status:









 BP Diastolic  70mm[Hg]  Status:

 

 Heart Rate  69/min  Status:

 

 Height  72in  Status:

 

 Weight  256lb  Status:

 

 Body Mass Index Calculated  34.72kg/m2  Status:

 

 Body Surface Area Calculated  2.37m2  Status:







Results







 Date  Description  Value  Details

 

 12-Oct-2016  [[facility]] COMPREHENSIVE    



 09:20  METABOLIC PANEL W/O eGFR    

 

   Glucose, Serum  78  mg/dL  Range: 65-99

 

   BUN  12  mg/dL  Range: 6-24

 

   Creatine, Serum  0.82  mg/dL  Range: 0.76-1.27

 

   eGFR If NonAfricn Am  109  mL/min/1.7  Range: >59

 

   eGFR If Africn Am  126  mL/min/1.7  Range: >59

 

   BUN/Creatine Ratio  15  Range: 9-20

 

   Sodium, Serum  144  mmol/L  Range: 134-144



       Comments:**Effective 2016 the reference interval**          
       for Sodium, Serum will be changing to:                                  
                         136 - 144

 

   Potassium, Serum  4.6  mmol/L  Range: 3.5-5.2



       Comments:**Effective 2016 the reference interval**          
       for Potassium, Serum will be changing to:                               
         0 -  7 days        3.7 - 5.2



       8 - 30 days        3.7 - 6.4                                        1 -  
6 months      3.8 - 6.0                                 7 months -  1 year     
   3.8 - 5.3>1 year        3.5 - 5.2

 

   Chloride, Serum  99  mmol/L  Range: 



       Comments:**Effective 2016 the reference interval**          
       for Chloride, Serum will be changing to:                                
                            97 - 106

 

   Carbon Dioxide, Total  28  mmol/L  Range: 18-29

 

   Calcium, Serum  9.7  mg/dL  Range: 8.7-10.2

 

   Protein, Total, Serum  6.8  g/dL  Range: 6.0-8.5

 

   Albumin, Serum  4.3  g/dL  Range: 3.5-5.5

 

   Globulin, Total  2.5  g/dL  Range: 1.5-4.5

 

   A/G Ratio  1.7  Range: 1.1-2.5

 

   Bilirubin, Total  0.3  mg/dL  Range: 0.0-1.2

 

   Alkaline Phosphatase, S  50  IU/L  Range: 

 

   AST (SGOT)  50  IU/L (Above high  Range: 0-40



     threshold)  

 

   ALT (SGPT)  52  IU/L (Above high  Range: 0-44



     threshold)  

 

 09:20  [[facility]] LIPID PANEL    

 

   Cholesterol, Total  153  mg/dL  Range: 100-199

 

   Triglycerides  243  mg/dL (Above  Range: 0-149



     high threshold)  

 

   HDL Cholesterol  21  mg/dL (Below low  Range: >39



     threshold)  Comments:According to ATP-III Guidelines, HDL-C >59 mg/dL is 
considered anegative risk factor for CHD.

 

   VLDL Cholesterol Cheikh  49  mg/dL (Above  Range: 5-40



     high threshold)  

 

   LDL Cholesterol Calc  83  mg/dL  Range: 0-99

 

   Comment:    



       

 

   LDL/HDL Ratio  4.0  ratio unit  Range: 0.0-3.6



     (Above high  Comments:LDL/HDL Ratio                                       
                    Men  Women                                             1/2 
Avg.Risk  1.0    1.5



     threshold)  Avg.Risk  3.6    3.2                                          
    2X Avg.Risk  6.2    5.0                                              3X 
Avg.Risk  8.0    6.1

 

 09:20  [[facility]] HEMOGLOBIN A1c    

 

   Hemoglobin A1c  5.9  % (Above high  Range: 4.8-5.6



     threshold)  Comments:.         Pre-diabetes: 5.7 - 6.4         Diabetes: >
6.4         Glycemic control for adults with diabetes: <7.0

 

 09:20  [[facility]] VALPROIC ACID    

 

   Valproic Acid  90  ug/mL  Range: 



   (Depakote),S    Comments:Detection Limit=4                                  
        <4 indicates None Detected                                             
                      .               Toxicity may occur at levels



       of 100-500. Measurements               of free unbound valproic acid may 
improve the assess-               ment of clinical response.

 

 27-Oct-2016  [[facility]] HEPATITIS PANEL    



 10:10      

 

   Hep A Ab, IgM  Negative  Range: Negative

 

   HBsAg Screen  Negative  Range: Negative

 

   Hep B Core Ab, IgM  Negative  Range: Negative

 

   Hep C Virus Ab  <0.1  s/co ratio  Range: 0.0-0.9



       Comments:Negative:     < 0.8                                            
Indeterminate: 0.8 - 0.9                                                 
Positive:     > 0.9



       .                The CDC recommends that a positive HCV antibody result 
               be followed up with a HCV Nucleic Acid Amplification            
    test ([id number]).

 

 10:10  [[facility]] HEPATITIS B SURFACE    



   ANTIBODY (QUANT)    

 

   Hep B Surface Ab, Qual  Non Reactive  Comments:Non Reactive: Inconsistent 
with immunity,                                           less than 10 mIU/mL   
                          Reactive:     Consistent with immunity,greater than 
9.9 mIU/mL

 

 10:10  [[facility]] HEPATITIS A AB,    



   TOTAL    

 

   Hep A Ab, Total  Negative  Range: Negative







Plan of Care

Planned Observations





 Name  Dates  Details

 

 Planned Goals not documented    Goal



Planned Encounters





 Appointment; Provider: Keith Perez  



   11:00







Instructions

Instructions not documented



Encounters







 Appointment; Keith Perez  On26-Oct-2016



 Encounter Diagnosis:Problem not documented  10:30

 

 Appointment; HUNTER CERVANTES  On10-Oct-2016



 Encounter Diagnosis:Problem not documented  13:30

 

 Appointment; Keith Perez  



 Encounter Diagnosis:Problem not documented  14:00

 

 Appointment; Keith Perez  



 Encounter Diagnosis:Problem not documented  14:00

 

 Appointment; HUNTER CERVANTES  



 Encounter Diagnosis:Problem not documented  10:30

 

 Appointment; HUNTER CERVANTES  



 Encounter Diagnosis:Problem not documented  13:00

 

 Appointment; Keith Perez  



 Encounter Diagnosis:Problem not documented  13:00

 

 Appointment; YOLANDA MAGDALENO  



 Encounter Diagnosis:Problem not documented  11:00

 

 Appointment; Keith Perez  



 Encounter Diagnosis:Problem not documented  14:00

 

 Appointment; HAMMAD DASH  On25-Mar-2015



 Encounter Diagnosis:Problem not documented  11:00

 

 Appointment; MATT LAWRENCE  



 Encounter Diagnosis:Problem not documented  10:30

## 2017-09-28 NOTE — XMS
Summary of Care

 Created on:2017



Patient:FCO OSMAN

Sex:Male

:1975

External Reference #:[id number]





Demographics







 Address  [address] [place] [id number]

 

 Phone  Unavailable

 

 Preferred Language  English

 

 Marital Status  Unknown

 

 Anglican Affiliation  Unknown

 

 Race  Other Race

 

 Ethnic Group  Not  or 









Author







 Name  HUNTER CERVANTES

 

 Address  Unavailable



   Unavailable



   ,









Care Team Providers







 Name  Role  Phone

 

 Keith Perez  Unavailable  Unavailable

 

 HUNTER CERVANTES  Unavailable  Unavailable

 

 CAITLYN JONES MD  Primary Care Provider  Unavailable

 

 CAITLYN DWYER  Unavailable  Unavailable

 

 ,  Unavailable  Unavailable









Functional Status

Functional Status Health Issues





 Name  Dates  Details

 

 Functional status health issues are not documented    Status:



Cognitive Status Health Issues





 Name  Dates  Details

 

 Cognitive status health issues are not documented    Status:







Problems







 Name  Dates  Details

 

 Intracranial injury, initial encounter    Status:Active

 

 Cerumen impaction(380.4, H61.20)    Status:Active

 

 Hyperlipidemia(272.4, E78.5)    Status:Active

 

 Well adult on routine health check(V70.0, Z00.00)    Status:Active

 

 Influenza vaccine needed(V04.81, Z23)    Status:Active

 

 Hypothyroidism(244.9, E03.9)    Status:Active

 

 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

 

 Depressive disorder(311, F32.9)    Status:Active

 

 Behavioral problems(V40.9)    Status:Active

 

 Head injury(959.01, S09.90XA)    Status:Active

 

 Essential hypertriglyceridemia(272.1, E78.1)    Status:Active

 

 Elevated liver function tests(790.6, R94.5)    Status:Active

 

 Pre-diabetes(790.29, R73.03)    Status:Active







Medications







 Name  Dates  Details









 Divalproex Sodium  MG Oral Tablet Extended Release 24 Hour



 TAKE THREE (3) TABLETS BY MOUTH EVERY MORNING AND THREE (3) TABLETS EVERY 
EVENING.









   Quantity:180  Refills:0







HUNTER CERVANTES M.D.





 Started4-May-2010



ActiveSertraline HCl - 100 MG Oral Tablet

TAKE ONE (1) TABLET(S) BY MOUTH TWICE A DAY.







 Quantity:60  Refills:0







HUNTER CERVANTES M.D.





 Started4-May-2010



ActiveLevothyroxine Sodium 100 MCG Oral Tablet

TAKE 1 TABLET DAILY AS DIRECTED.







 Quantity:90  Refills:1







Keith Perez N.P.





 [id number]



ActiveRosuvastatin Calcium 10 MG Oral Tablet

TAKE ONE (1) TABLET(S) BY MOUTH ONCE A DAY.







 Quantity:90  Refills:1







Keith Perez N.P.





 Started4-Dec-2012



ActiveOmega-3-acid Ethyl Esters 1 GM Oral Capsule

TAKE TWO (2) CAPSULE(S) BY MOUTH TWICE A DAY.







 Quantity:360  Refills:0







Keith Peerz N.P.





 [id number]



ActiveFenofibrate 145 MG Oral Tablet

TAKE 1 TABLET DAILY.







 Quantity:90  Refills:1







Keith Perez N.P.





 Started4-Dec-2012



ActiveOLANZapine 5 MG Oral Tablet

TAKE ONE (1) TABLET(S) BY MOUTH AT BEDTIME. TAKE WITH OLANZAPINE 20 MG.







 Quantity:30  Refills:0







HUNTER CERVANTES M.D.





 [id number]



ActiveOmega-3-acid Ethyl Esters 1 GM Oral Capsule

TAKE 2 CAPSULE EVERY 12 HOURS







 Quantity:360  Refills:3







Keith Perez N.P.





 [id number]



ActiveFenofibrate 145 MG Oral Tablet

TAKE one (1) tablet(s) by mouth every day.







 Quantity:90  Refills:3







Keith Perez N.P.





 Started10-Sep-2014



ActiveOLANZapine 20 MG Oral Tablet

TAKE ONE (1) TABLET(S) BY MOUTH AT BEDTIME ALONG WITH OLANZAPINE 5MG.







 Quantity:30  Refills:0







HUNTER CERVANTES M.D.





 Started24-Sep-2014



Active



Allergies and Adverse Reactions







 Name  Dates  Details

 

 No Known Drug Allergies    Status:Active







Past Medical History







 Name  Dates  Details

 

 Head injury(959.01, S09.90XA)    Status:Active

 

 History of Acute upper respiratory infection(465.9, J06.9)    Status:Resolved

 

 History of Cough(786.2, R05)    Status:Resolved

 

 History of essential hypertension(V12.59, Z86.79)    Status:Resolved

 

 History of hyperlipidemia(V12.29, Z86.39)    Status:Resolved

 

 History of hypothyroidism(V12.29, Z86.39)    Status:Resolved

 

 History of obesity(V13.89, Z86.39)    Status:Resolved







Procedures







 Procedure  Dates  Details

 

 History of Brain Surgery    

 

 History of Inguinal Hernia Repair    

 

 History of Creation Of Ventriculo-Peritoneal CSF Shunt    

 

 Procedures not documented    







Immunization







 Name  Dates  Details

 

 Influenza  Administered on:20-Oct-2011  



 Lot #:[id number]    

 

 Influenza  Administered on:2014  



 Lot #:[id number]    

 

 Fluzone Quadrivalent 0.5 ML Intramuscular Suspension  Administered on:2016  



 Lot #:[id number]    

 

 Fluzone Quadrivalent 0.5 ML Intramuscular Suspension Prefilled Syringe  
Administered on:26-Oct-2016  



 Lot #:[id number]    







Family History

Mother





 Name  Dates  Details

 

 Family history of dementia(V17.2, Z81.8)    Status:Active

 

 Family history of depression(V17.0, Z81.8)    Status:Active



Father





 Name  Dates  Details

 

 Family history of dementia(V17.2, Z81.8)    Status:Active

 

 Family history of Alcohol dependence(303.90, F10.20)    Status:Active



Sister





 Name  Dates  Details

 

 Family history of depression(V17.0, Z81.8)    Status:Active



Brother





 Name  Dates  Details

 

 Family history of depression(V17.0, Z81.8)    Status:Active







Social History







 Name  Dates  Details

 

 -    



Smoking StatusNever smoker



Vital Signs







 Date  Test  Result  Details

 

       

 

   No Known Vitals to report    



       







Results







 Date  Description  Value  Details

 

   Results not documented    



       

 

       







Plan of Care

Planned Observations





 Name  Dates  Details

 

 Planned Goals not documented    Goal







Interventions Provided

Medication ChangesOLANZapine 20 MG Oral Tablet - RenewOLANZapine 5 MG Oral 
Tablet - RenewSertraline HCl - 100 MG Oral Tablet - Renew



Instructions

Instructions not documented



Encounters







 Appointment; Keith Perez  On26-Oct-2016



 Encounter Diagnosis:Problem not documented  10:30

 

 Appointment; HUNTER CERVANTES  On10-Oct-2016



 Encounter Diagnosis:Problem not documented  13:30

 

 Appointment; Keith Perez  



 Encounter Diagnosis:Problem not documented  14:00

 

 Appointment; Keith Perez  



 Encounter Diagnosis:Problem not documented  14:00

 

 Appointment; HUNTER CERVANTES  



 Encounter Diagnosis:Problem not documented  10:30

 

 Appointment; HUNTER CERVANTES  



 Encounter Diagnosis:Problem not documented  13:00

 

 Appointment; Keith Perez  



 Encounter Diagnosis:Problem not documented  13:00

 

 Appointment; YOLANDA MAGDALENO  



 Encounter Diagnosis:Problem not documented  11:00

 

 Appointment; Keith Perez  



 Encounter Diagnosis:Problem not documented  14:00

 

 Appointment; HAMMAD DASH  On25-Mar-2015



 Encounter Diagnosis:Problem not documented  11:00

## 2017-09-28 NOTE — XMS
Summary of Care

 Created on:2016



Patient:FCO OSMAN

Sex:Male

:1975

External Reference #:[id number]





Demographics







 Address  [address] [place] [id number]

 

 Phone  Unavailable

 

 Preferred Language  English

 

 Marital Status  Unknown

 

 Buddhist Affiliation  Unknown

 

 Race  Other Race

 

 Ethnic Group  Not  or 









Author







 Name  HUNTER CERVANTES

 

 Address  Unavailable



   Unavailable



   ,









Care Team Providers







 Name  Role  Phone

 

 Keith Perez  Unavailable  Unavailable

 

 HUNTER CERVANTES  Unavailable  Unavailable

 

 CAITLYN JONES MD  Primary Care Provider  Unavailable

 

 CAITLYN DWYER  Unavailable  Unavailable

 

 ,  Unavailable  Unavailable









Functional Status

Functional Status Health Issues





 Name  Dates  Details

 

 Functional status health issues are not documented    Status:



Cognitive Status Health Issues





 Name  Dates  Details

 

 Cognitive status health issues are not documented    Status:







Problems







 Name  Dates  Details

 

 Intracranial injury, initial encounter    Status:Active

 

 Cerumen impaction(380.4, H61.20)    Status:Active

 

 Hyperlipidemia(272.4, E78.5)    Status:Active

 

 Well adult on routine health check(V70.0, Z00.00)    Status:Active

 

 Influenza vaccine needed(V04.81, Z23)    Status:Active

 

 Hypothyroidism(244.9, E03.9)    Status:Active

 

 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

 

 Depressive disorder(311, F32.9)    Status:Active

 

 Behavioral problems(V40.9)    Status:Active

 

 Head injury(959.01, S09.90XA)    Status:Active

 

 Essential hypertriglyceridemia(272.1, E78.1)    Status:Active

 

 Elevated liver function tests(790.6, R79.89)    Status:Active

 

 Pre-diabetes(790.29, R73.09)    Status:Active







Medications







 Name  Dates  Details









 Divalproex Sodium  MG Oral Tablet Extended Release 24 Hour



 TAKE THREE (3) TABLETS BY MOUTH EVERY MORNING AND THREE (3) TABLETS EVERY 
EVENING.









   Quantity:180  Refills:0







HUNTER CERVANTES M.D.





 Started4-May-2010



ActiveSertraline HCl - 100 MG Oral Tablet

TAKE ONE (1) TABLET(S) BY MOUTH TWICE A DAY.







 Quantity:60  Refills:2







HUNTER CERVANTES M.D.





 Started4-May-2010



ActiveLevothyroxine Sodium 100 MCG Oral Tablet

TAKE 1 TABLET DAILY AS DIRECTED.







 Quantity:90  Refills:1







Keith Perez N.P.





 [id number]



ActiveRosuvastatin Calcium 10 MG Oral Tablet

TAKE ONE (1) TABLET(S) BY MOUTH ONCE A DAY.







 Quantity:90  Refills:1







Chris Keith LOYD





 Started4-Dec-2012



ActiveOmega-3-acid Ethyl Esters 1 GM Oral Capsule

TAKE 2 CAPSULES TWICE DAILY.







 Quantity:360  Refills:1







LewiswiKeith carpio N.P.ActiveFenofibrate 145 MG Oral Tablet

TAKE 1 TABLET DAILY.







 Quantity:90  Refills:1







SandritaKeith gonzalez NWILNER





 Started4-Dec-2012



ActiveOLANZapine 5 MG Oral Tablet

TAKE ONE (1) TABLET(S) BY MOUTH AT BEDTIME. TAKE WITH OLANZAPINE 20 MG.







 Quantity:30  Refills:2







HUNTER CERVANTES M.D.





 [id number]



ActiveOmega-3-acid Ethyl Esters 1 GM Oral Capsule

TAKE 2 CAPSULE EVERY 12 HOURS







 Quantity:360  Refills:3







Keith Perez N.P.





 [id number]



ActiveFenofibrate 145 MG Oral Tablet

TAKE one (1) tablet(s) by mouth every day.







 Quantity:90  Refills:3







Lewiswibalaji Keith LOYD





 Started10-Sep-2014



ActiveOLANZapine 20 MG Oral Tablet

TAKE ONE (1) TABLET(S) BY MOUTH AT BEDTIME ALONG WITH OLANZAPINE 5MG.







 Quantity:30  Refills:2







HUNTER CERVANTES M.D.





 Started24-Sep-2014



Active



Allergies and Adverse Reactions







 Name  Dates  Details

 

 No Known Drug Allergies    Status:Active







Past Medical History







 Name  Dates  Details

 

 Head injury(959.01, S09.90XA)    Status:Active

 

 History of Acute upper respiratory infection(465.9, J06.9)    Status:Resolved

 

 History of Cough(786.2, R05)    Status:Resolved

 

 History of essential hypertension(V12.59, Z86.79)    Status:Resolved

 

 History of hyperlipidemia(V12.29, Z86.39)    Status:Resolved

 

 History of hypothyroidism(V12.29, Z86.39)    Status:Resolved

 

 History of obesity(V13.89, Z86.39)    Status:Resolved







Procedures







 Procedure  Dates  Details

 

 History of Brain Surgery    

 

 History of Inguinal Hernia Repair    

 

 History of Creation Of Ventriculo-Peritoneal CSF Shunt    

 

 [[facility]] LIPID PANEL  Ordered:26-Oct-2016  

 

 [[facility]] CMP W/EGFR  Ordered:26-Oct-2016  

 

 [[facility]] HEMOGLOBIN A1c  Ordered:26-Oct-2016  

 

 [[facility]] HEPATITIS B SURFACE ANTIBODY (QUANT)  Ordered:26-Oct-2016  

 

 [[facility]] HEPATITIS A AB, TOTAL  Ordered:26-Oct-2016  

 

 [[facility]] HEPATITIS PANEL  Ordered:26-Oct-2016  

 

  Liver [address]  Ordered:26-Oct-2016  







Immunization







 Name  Dates  Details

 

 Influenza  Administered on:20-Oct-2011  



 Lot #:[id number]    

 

 Influenza  Administered on:2014  



 Lot #:[id number]    

 

 Fluzone Quadrivalent 0.5 ML Intramuscular Suspension  Administered on:2016  



 Lot #:[id number]    

 

 Fluzone Quadrivalent 0.5 ML Intramuscular Suspension Prefilled Syringe  
Administered on:26-Oct-2016  



 Lot #:[id number]    







Family History

Mother





 Name  Dates  Details

 

 Family history of dementia(V17.2, Z81.8)    Status:Active

 

 Family history of depression(V17.0, Z81.8)    Status:Active



Father





 Name  Dates  Details

 

 Family history of dementia(V17.2, Z81.8)    Status:Active

 

 Family history of Alcohol dependence(303.90, F10.20)    Status:Active



Sister





 Name  Dates  Details

 

 Family history of depression(V17.0, Z81.8)    Status:Active



Brother





 Name  Dates  Details

 

 Family history of depression(V17.0, Z81.8)    Status:Active







Social History







 Name  Dates  Details

 

 -    



Smoking StatusNever smoker



Vital Signs







 Date  Test  Result  Details

 

       

 

   No Known Vitals to report    



       







Results







 Date  Description  Value  Details

 

   Results not documented    



       

 

       







Plan of Care

Planned Observations





 Name  Dates  Details

 

 Planned Goals not documented    Goal







Interventions Provided

Medication ChangesDivalproex Sodium  MG Oral Tablet Extended Release 24 
Hour - Renew



Instructions

Instructions not documented



Encounters







 Appointment; Keith Perez  On26-Oct-2016



 Encounter Diagnosis:Problem not documented  10:30

 

 Appointment; HUNTER CERVANTES  On10-Oct-2016



 Encounter Diagnosis:Problem not documented  13:30

 

 Appointment; Keith Perez  



 Encounter Diagnosis:Problem not documented  14:00

 

 Appointment; Keith Perez  



 Encounter Diagnosis:Problem not documented  14:00

 

 Appointment; HUNTER CERVANTES  



 Encounter Diagnosis:Problem not documented  10:30

 

 Appointment; HUNTER CERVANTES  



 Encounter Diagnosis:Problem not documented  13:00

 

 Appointment; Keith Perez  



 Encounter Diagnosis:Problem not documented  13:00

 

 Appointment; YOLANDA MAGDLAENO  



 Encounter Diagnosis:Problem not documented  11:00

 

 Appointment; Keith Perez  



 Encounter Diagnosis:Problem not documented  14:00

 

 Appointment; HAMMAD DASH  On25-Mar-2015



 Encounter Diagnosis:Problem not documented  11:00

## 2017-09-28 NOTE — XMS
Summary of Care

 Created on:2017



Patient:FCO OSMAN

Sex:Male

:1975

External Reference #:[id number]





Demographics







 Address  [address] [place] [address]

 

 Phone  Unavailable

 

 Preferred Language  Unknown

 

 Marital Status  Unknown

 

 Latter day Affiliation  Unknown

 

 Race  White

 

 Ethnic Group  Not  or 









Author







 Name  HUNTER CERVANTES

 

 Address  Unavailable



   Unavailable



   ,









Care Team Providers







 Name  Role  Phone

 

 Keith Perez  Unavailable  Unavailable

 

 HUNTER CERVANTES  Unavailable  Unavailable

 

 CAITLYN JONES MD  Primary Care Provider  Unavailable

 

 CAITLYN DWYER  Unavailable  Unavailable

 

 ,  Unavailable  Unavailable









Functional Status

Functional Status Health Issues





 Name  Dates  Details

 

 Functional status health issues are not documented    Status:



Cognitive Status Health Issues





 Name  Dates  Details

 

 Cognitive status health issues are not documented    Status:







Problems







 Name  Dates  Details

 

 Intracranial injury, initial encounter    Status:Active

 

 Cerumen impaction(380.4, H61.20)    Status:Active

 

 Well adult on routine health check(V70.0, Z00.00)    Status:Active

 

 Influenza vaccine needed(V04.81, Z23)    Status:Active

 

 Hypothyroidism(244.9, E03.9)    Status:Active

 

 Head injury(959.01, S09.90XA)    Status:Active

 

 Essential hypertriglyceridemia(272.1, E78.1)    Status:Active

 

 Elevated liver function tests(790.6, R94.5)    Status:Active

 

 Hyperlipidemia(272.4, E78.5)    Status:Active

 

 Pre-diabetes(790.29, R73.03)    Status:Active

 

 Need for hepatitis A and B vaccination(V05.3, Z23)    Status:Active

 

 Behavioral problems(V40.9)    Status:Active

 

 Depressive disorder(311, F32.9)    Status:Active

 

 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







Medications







 Name  Dates  Details









 Divalproex Sodium  MG Oral Tablet Extended Release 24 Hour



 TAKE THREE (3) TABLETS BY MOUTH EVERY MORNING AND THREE (3) TABLETS EVERY 
EVENING.









   Quantity:540  Refills:0







HUNTER CERVANTES M.D.





 Started4-May-2010



ActiveSertraline HCl - 100 MG Oral Tablet

TAKE ONE (1) TABLET(S) BY MOUTH TWICE A DAY.







 Quantity:180  Refills:0







HUNTER CERVANTES M.D.





 Started4-May-2010



ActiveLevothyroxine Sodium 100 MCG Oral Tablet

TAKE 1 TABLET DAILY AS DIRECTED.







 Quantity:90  Refills:1







Keith Perez NWILNER





 [id number]



ActiveRosuvastatin Calcium 10 MG Oral Tablet

TAKE ONE (1) TABLET(S) BY MOUTH ONCE A DAY.







 Quantity:90  Refills:1







LewiswiTomer carpioin N.P.





 Started4-Dec-2012



ActiveOmega-3-acid Ethyl Esters 1 GM Oral Capsule

TAKE TWO (2) CAPSULE(S) BY MOUTH TWICE A DAY.







 Quantity:360  Refills:0







Keith Perez N.JONATHON





 [id number]



ActiveFenofibrate 145 MG Oral Tablet

TAKE 1 TABLET DAILY.







 Quantity:90  Refills:1







Keith Perez N.P.





 Started4-Dec-2012



ActiveOLANZapine 5 MG Oral Tablet

TAKE ONE (1) TABLET(S) BY MOUTH AT BEDTIME. TAKE WITH OLANZAPINE 20 MG.







 Quantity:90  Refills:0







HUNTER CERVANTES M.D.





 [id number]



ActiveOmega-3-acid Ethyl Esters 1 GM Oral Capsule

TAKE 2 CAPSULE EVERY 12 HOURS







 Quantity:360  Refills:3







Keith Perez N.JONATHON





 [id number]



ActiveFenofibrate 145 MG Oral Tablet

TAKE one (1) tablet(s) by mouth every day.







 Quantity:90  Refills:3







Keith Perez N.JONATHON





 Started10-Sep-2014



ActiveOLANZapine 20 MG Oral Tablet

TAKE ONE (1) TABLET(S) BY MOUTH AT BEDTIME ALONG WITH OLANZAPINE 5MG.







 Quantity:90  Refills:0







HUNTER CERVANTES M.D.





 Started24-Sep-2014



Active



Allergies and Adverse Reactions







 Name  Dates  Details

 

 No Known Drug Allergies    Status:Active







Past Medical History







 Name  Dates  Details

 

 Head injury(959.01, S09.90XA)    Status:Active

 

 History of Acute upper respiratory infection(465.9, J06.9)    Status:Resolved

 

 History of Cough(786.2, R05)    Status:Resolved

 

 History of essential hypertension(V12.59, Z86.79)    Status:Resolved

 

 History of hyperlipidemia(V12.29, Z86.39)    Status:Resolved

 

 History of hypothyroidism(V12.29, Z86.39)    Status:Resolved

 

 History of obesity(V13.89, Z86.39)    Status:Resolved







Procedures







 Procedure  Dates  Details

 

 History of Brain Surgery    

 

 History of Inguinal Hernia Repair    

 

 History of Creation Of Ventriculo-Peritoneal CSF Shunt    

 

 [[facility]] LIPID PANEL  Ordered:2017  

 

 [[facility]] CMP W/EGFR  Ordered:2017  

 

 [[facility]] HEMOGLOBIN A1c  Ordered:2017  

 

  Liver [address]  Ordered:2017  







Immunization







 Name  Dates  Details

 

 Influenza  Administered on:20-Oct-2011  



 Lot #:[id number]    

 

 Influenza  Administered on:2014  



 Lot #:[id number]    

 

 Fluzone Quadrivalent 0.5 ML Intramuscular Suspension  Administered on:2016  



 Lot #:[id number]    

 

 Fluzone Quadrivalent 0.5 ML Intramuscular Suspension Prefilled Syringe  
Administered on:26-Oct-2016  



 Lot #:[id number]    







Family History

Mother





 Name  Dates  Details

 

 Family history of dementia(V17.2, Z81.8)    Status:Active

 

 Family history of depression(V17.0, Z81.8)    Status:Active



Father





 Name  Dates  Details

 

 Family history of dementia(V17.2, Z81.8)    Status:Active

 

 Family history of Alcohol dependence(303.90, F10.20)    Status:Active



Sister





 Name  Dates  Details

 

 Family history of depression(V17.0, Z81.8)    Status:Active



Brother





 Name  Dates  Details

 

 Family history of depression(V17.0, Z81.8)    Status:Active







Social History







 Name  Dates  Details

 

 -    



Smoking StatusNever smoker



Vital Signs







 Date  Test  Result  Details

 

       

 

 2017 11:10  BP Systolic  111mm[Hg]  Status:









 BP Diastolic  74mm[Hg]  Status:

 

 Temperature  97.9f  Status:

 

 Heart Rate  77/min  Status:

 

 Physical Findings  16  Status:Comments:Respiration

 

 Height  72in  Status:

 

 Weight  242.25lb  Status:

 

 Body Mass Index Calculated  32.86kg/m2  Status:

 

 Body Surface Area Calculated  2.31m2  Status:









       

 

 2017 09:45  BP Systolic  118mm[Hg]  Status:









 BP Diastolic  76mm[Hg]  Status:

 

 Height  72in  Status:

 

 Weight  242lb  Status:

 

 Body Mass Index Calculated  32.82kg/m2  Status:

 

 Body Surface Area Calculated  2.31m2  Status:







Results







 Date  Description  Value  Details

 

   Results not documented    



       

 

       







Plan of Care

Planned Observations





 Name  Dates  Details

 

 Planned Goals not documented    Goal



Planned Encounters





 Appointment; Provider: Keith Perez  [id number]



   10:45







Interventions Provided

Medication ChangesDivalproex Sodium  MG Oral Tablet Extended Release 24 
Hour - RenewOLANZapine 20 MG Oral Tablet - RenewOLANZapine 5 MG Oral Tablet - 
RenewSertraline HCl - 100 MG Oral Tablet - RenewPlan Section DataPlan Section: 
Details from Note:Discussed diagnosis, differential diagnosis, co morbidities, 
bio psychosocial factors, predisposing, precipitating and maintaining symptoms 
- Continue current dose of Depakote, Zyprexa and Zoloft.- F/u with MD in 2-3 
month as it is difficulty for sister to bring patient to appointments.-Call 
with psychiatric concerns prior to next f/u visit.



Instructions

Instructions not documented



Encounters







 Appointment; Keith Perez  



 Encounter Diagnosis:Problem not documented  11:00

 

 Appointment; Keith Perez  On26-Oct-2016



 Encounter Diagnosis:Problem not documented  10:30

 

 Appointment; HUNTER CERVANTES  On10-Oct-2016



 Encounter Diagnosis:Problem not documented  13:30

 

 Appointment; Keith Perez  



 Encounter Diagnosis:Problem not documented  14:00

 

 Appointment; Keith Perez  



 Encounter Diagnosis:Problem not documented  14:00

 

 Appointment; HUNTER CERVANTES  



 Encounter Diagnosis:Problem not documented  10:30

 

 Appointment; HUNTER CERVANTES  



 Encounter Diagnosis:Problem not documented  13:00

 

 Appointment; Keith ePrez  



 Encounter Diagnosis:Problem not documented  13:00

 

 Appointment; YOLANDA MAGDALENO  



 Encounter Diagnosis:Problem not documented  11:00

 

 Appointment; Keith Perez  



 Encounter Diagnosis:Problem not documented  14:00

 

 Appointment; HAMMAD DASH  On25-Mar-2015



 Encounter Diagnosis:Problem not documented  11:00

## 2017-09-28 NOTE — XMS
Summary of Care

 Created on:2017



Patient:CFO OSMAN

Sex:Male

:1975

External Reference #:[id number]





Demographics







 Address  [address] [place] [id number]

 

 Phone  Unavailable

 

 Preferred Language  Unknown

 

 Marital Status  Unknown

 

 Holiness Affiliation  Unknown

 

 Race  White

 

 Ethnic Group  Not  or 









Author







Care Team Providers







 Name  Role  Phone

 

 Chris Keith  Unavailable  Unavailable

 

 HUNTER CERVANTES  Unavailable  Unavailable

 

 CAITLYN JONES MD  Primary Care Provider  Unavailable

 

 CAITLYN DWYER  Unavailable  Unavailable

 

 ,  Unavailable  Unavailable









Functional Status

Functional Status Health Issues





 Name  Dates  Details

 

 Functional status health issues are not documented    Status:



Cognitive Status Health Issues





 Name  Dates  Details

 

 Cognitive status health issues are not documented    Status:







Problems







 Name  Dates  Details

 

 Intracranial injury, initial encounter    Status:Active

 

 Cerumen impaction(380.4, H61.20)    Status:Active

 

 Well adult on routine health check(V70.0, Z00.00)    Status:Active

 

 Influenza vaccine needed(V04.81, Z23)    Status:Active

 

 Hypothyroidism(244.9, E03.9)    Status:Active

 

 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

 

 Depressive disorder(311, F32.9)    Status:Active

 

 Behavioral problems(V40.9)    Status:Active

 

 Head injury(959.01, S09.90XA)    Status:Active

 

 Essential hypertriglyceridemia(272.1, E78.1)    Status:Active

 

 Elevated liver function tests(790.6, R94.5)    Status:Active

 

 Hyperlipidemia(272.4, E78.5)    Status:Active

 

 Pre-diabetes(790.29, R73.03)    Status:Active

 

 Need for hepatitis A and B vaccination(V05.3, Z23)    Status:Active







Medications







 Name  Dates  Details









 Divalproex Sodium  MG Oral Tablet Extended Release 24 Hour



 TAKE THREE (3) TABLETS BY MOUTH EVERY MORNING AND THREE (3) TABLETS EVERY 
EVENING.









   Quantity:180  Refills:0







HUNTER CERVANTES M.D.





 Started4-May-2010



ActiveSertraline HCl - 100 MG Oral Tablet

TAKE ONE (1) TABLET(S) BY MOUTH TWICE A DAY.







 Quantity:60  Refills:0







HUNTER CERVANTES M.D.





 Started4-May-2010



ActiveLevothyroxine Sodium 100 MCG Oral Tablet

TAKE 1 TABLET DAILY AS DIRECTED.







 Quantity:90  Refills:1







Keith Perez N.P.





 [id number]



ActiveRosuvastatin Calcium 10 MG Oral Tablet

TAKE ONE (1) TABLET(S) BY MOUTH ONCE A DAY.







 Quantity:90  Refills:1







Keith Perez N.P.





 Started4-Dec-2012



ActiveOmega-3-acid Ethyl Esters 1 GM Oral Capsule

TAKE TWO (2) CAPSULE(S) BY MOUTH TWICE A DAY.







 Quantity:360  Refills:0







Keith Perez N.P.





 [id number]



ActiveFenofibrate 145 MG Oral Tablet

TAKE 1 TABLET DAILY.







 Quantity:90  Refills:1







Keith Perez N.P.





 Started4-Dec-2012



ActiveOLANZapine 5 MG Oral Tablet

TAKE ONE (1) TABLET(S) BY MOUTH AT BEDTIME. TAKE WITH OLANZAPINE 20 MG.







 Quantity:30  Refills:0







HUNTER CERVANTES M.D.





 [id number]



ActiveOmega-3-acid Ethyl Esters 1 GM Oral Capsule

TAKE 2 CAPSULE EVERY 12 HOURS







 Quantity:360  Refills:3







Keith Perez N.P.





 [id number]



ActiveFenofibrate 145 MG Oral Tablet

TAKE one (1) tablet(s) by mouth every day.







 Quantity:90  Refills:3







Keith Perez N.P.





 Started10-Sep-2014



ActiveOLANZapine 20 MG Oral Tablet

TAKE ONE (1) TABLET(S) BY MOUTH AT BEDTIME ALONG WITH OLANZAPINE 5MG.







 Quantity:30  Refills:0







HUNTER CERVANTES M.D.





 Started24-Sep-2014



Active



Allergies and Adverse Reactions







 Name  Dates  Details

 

 No Known Drug Allergies    Status:Active







Past Medical History







 Name  Dates  Details

 

 Head injury(959.01, S09.90XA)    Status:Active

 

 History of Acute upper respiratory infection(465.9, J06.9)    Status:Resolved

 

 History of Cough(786.2, R05)    Status:Resolved

 

 History of essential hypertension(V12.59, Z86.79)    Status:Resolved

 

 History of hyperlipidemia(V12.29, Z86.39)    Status:Resolved

 

 History of hypothyroidism(V12.29, Z86.39)    Status:Resolved

 

 History of obesity(V13.89, Z86.39)    Status:Resolved







Procedures







 Procedure  Dates  Details

 

 History of Brain Surgery    

 

 History of Inguinal Hernia Repair    

 

 History of Creation Of Ventriculo-Peritoneal CSF Shunt    

 

 [QL] HEMOGLOBIN A1c  Ordered:2017  

 

 [[facility]] CMP W/EGFR  Ordered:2017  

 

 [[facility]] LIPID PANEL  Ordered:2017  

 

  Liver [address]  Ordered:2017  







Immunization







 Name  Dates  Details

 

 Influenza  Administered on:20-Oct-2011  



 Lot #:[id number]    

 

 Influenza  Administered on:2014  



 Lot #:[id number]    

 

 Fluzone Quadrivalent 0.5 ML Intramuscular Suspension  Administered on:2016  



 Lot #:[id number]    

 

 Fluzone Quadrivalent 0.5 ML Intramuscular Suspension Prefilled Syringe  
Administered on:26-Oct-2016  



 Lot #:[id number]    







Family History

Mother





 Name  Dates  Details

 

 Family history of dementia(V17.2, Z81.8)    Status:Active

 

 Family history of depression(V17.0, Z81.8)    Status:Active



Father





 Name  Dates  Details

 

 Family history of dementia(V17.2, Z81.8)    Status:Active

 

 Family history of Alcohol dependence(303.90, F10.20)    Status:Active



Sister





 Name  Dates  Details

 

 Family history of depression(V17.0, Z81.8)    Status:Active



Brother





 Name  Dates  Details

 

 Family history of depression(V17.0, Z81.8)    Status:Active







Social History







 Name  Dates  Details

 

 -    



Smoking StatusNever smoker



Vital Signs







 Date  Test  Result  Details

 

       

 

 2017 11:10  BP Systolic  111mm[Hg]  Status:









 BP Diastolic  74mm[Hg]  Status:

 

 Temperature  97.9f  Status:

 

 Heart Rate  77/min  Status:

 

 Physical Findings  16  Status:Comments:Respiration

 

 Height  72in  Status:

 

 Weight  242.25lb  Status:

 

 Body Mass Index Calculated  32.86kg/m2  Status:

 

 Body Surface Area Calculated  2.31m2  Status:









       

 

 2017 09:45  BP Systolic  118mm[Hg]  Status:









 BP Diastolic  76mm[Hg]  Status:

 

 Height  72in  Status:

 

 Weight  242lb  Status:

 

 Body Mass Index Calculated  32.82kg/m2  Status:

 

 Body Surface Area Calculated  2.31m2  Status:







Results







 Date  Description  Value  Details

 

   Results not documented    



       

 

       







Plan of Care

Planned Observations





 Name  Dates  Details

 

 Planned Goals not documented    Goal



Planned Encounters





 Appointment; Provider: Keith Perez  [id number]



   10:45









 Appointment; Provider: HUNTER CERVANTES  On2-May-2017



   11:00







Instructions

Instructions not documented



Encounters







 Appointment; Keith Perez  



 Encounter Diagnosis:Problem not documented  11:15

 

 Appointment; HUNTER CERVANTES  



 Encounter Diagnosis:Problem not documented  09:30

 

 Appointment; Keith Perez  



 Encounter Diagnosis:Problem not documented  11:00

 

 Appointment; Keith Perez  On26-Oct-2016



 Encounter Diagnosis:Problem not documented  10:30

 

 Appointment; HUNTER CERVANTES  On10-Oct-2016



 Encounter Diagnosis:Problem not documented  13:30

 

 Appointment; Keith Perez  



 Encounter Diagnosis:Problem not documented  14:00

 

 Appointment; Keith Perez  



 Encounter Diagnosis:Problem not documented  14:00

 

 Appointment; HUNTER CERVANTES  



 Encounter Diagnosis:Problem not documented  10:30

 

 Appointment; HUNTER CERVANTES  



 Encounter Diagnosis:Problem not documented  13:00

 

 Appointment; Keith Perez  



 Encounter Diagnosis:Problem not documented  13:00

 

 Appointment; YOLANDA MAGDALENO  



 Encounter Diagnosis:Problem not documented  11:00

 

 Appointment; Keith Perez  



 Encounter Diagnosis:Problem not documented  14:00

 

 Appointment; HAMMAD DASH  On25-Mar-2015



 Encounter Diagnosis:Problem not documented  11:00

## 2017-09-28 NOTE — XMS
Summary of Care

 Created on:2017



Patient:FCO OSMAN

Sex:Male

:1975

External Reference #:[id number]





Demographics







 Address  [address] [place] [address]

 

 Phone  Unavailable

 

 Preferred Language  English

 

 Marital Status  Unknown

 

 Roman [employer] Affiliation  Unknown

 

 Race  Other Race

 

 Ethnic Group  Not  or 









Author







 Name  Keith Perez

 

 Address  Unavailable



   Unavailable



   ,









Care Team Providers







 Name  Role  Phone

 

 Sandritalisa Keith  Unavailable  Unavailable

 

 HUNTER CERVANTES  Unavailable  Unavailable

 

 CAITLYN JONES MD  Primary Care Provider  Unavailable

 

 CAITLYN DWYER  Unavailable  Unavailable

 

 ,  Unavailable  Unavailable









Functional Status

Functional Status Health Issues





 Name  Dates  Details

 

 Functional status health issues are not documented    Status:



Cognitive Status Health Issues





 Name  Dates  Details

 

 Cognitive status health issues are not documented    Status:







Problems







 Name  Dates  Details

 

 Intracranial injury, initial encounter    Status:Active

 

 Cerumen impaction(380.4, H61.20)    Status:Active

 

 Hyperlipidemia(272.4, E78.5)    Status:Active

 

 Well adult on routine health check(V70.0, Z00.00)    Status:Active

 

 Influenza vaccine needed(V04.81, Z23)    Status:Active

 

 Hypothyroidism(244.9, E03.9)    Status:Active

 

 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

 

 Depressive disorder(311, F32.9)    Status:Active

 

 Behavioral problems(V40.9)    Status:Active

 

 Head injury(959.01, S09.90XA)    Status:Active

 

 Essential hypertriglyceridemia(272.1, E78.1)    Status:Active

 

 Elevated liver function tests(790.6, R94.5)    Status:Active

 

 Pre-diabetes(790.29, R73.03)    Status:Active







Medications







 Name  Dates  Details









 Divalproex Sodium  MG Oral Tablet Extended Release 24 Hour



 TAKE THREE (3) TABLETS BY MOUTH EVERY MORNING AND THREE (3) TABLETS EVERY 
EVENING.









   Quantity:180  Refills:0







HUNTER CERVANTES M.D.





 Started4-May-2010



ActiveSertraline HCl - 100 MG Oral Tablet

TAKE ONE (1) TABLET(S) BY MOUTH TWICE A DAY.







 Quantity:60  Refills:2







HUNTER CERVANTES M.D.





 Started4-May-2010



ActiveLevothyroxine Sodium 100 MCG Oral Tablet

TAKE 1 TABLET DAILY AS DIRECTED.







 Quantity:90  Refills:1







Keith Perez N.P.





 [id number]



ActiveRosuvastatin Calcium 10 MG Oral Tablet

TAKE ONE (1) TABLET(S) BY MOUTH ONCE A DAY.







 Quantity:90  Refills:1







Ketih Perez N.PKallie





 Started4-Dec-2012



ActiveOmega-3-acid Ethyl Esters 1 GM Oral Capsule

TAKE 2 CAPSULES TWICE DAILY.







 Quantity:360  Refills:1







Keith Perez N.PKallieActiveFenofibrate 145 MG Oral Tablet

TAKE 1 TABLET DAILY.







 Quantity:90  Refills:1







Keith Perez N.PKallie





 Started4-Dec-2012



ActiveOLANZapine 5 MG Oral Tablet

TAKE ONE (1) TABLET(S) BY MOUTH AT BEDTIME. TAKE WITH OLANZAPINE 20 MG.







 Quantity:30  Refills:2







HUNTER CERVANTES M.D.





 [id number]



ActiveOmega-3-acid Ethyl Esters 1 GM Oral Capsule

TAKE 2 CAPSULE EVERY 12 HOURS







 Quantity:360  Refills:3







Keith Perez N.P.





 [id number]



ActiveFenofibrate 145 MG Oral Tablet

TAKE one (1) tablet(s) by mouth every day.







 Quantity:90  Refills:3







Keith Perez NWILNER





 Started10-Sep-2014



ActiveOLANZapine 20 MG Oral Tablet

TAKE ONE (1) TABLET(S) BY MOUTH AT BEDTIME ALONG WITH OLANZAPINE 5MG.







 Quantity:30  Refills:2







HUNTER CERVANTES M.D.





 Started24-Sep-2014



Active



Allergies and Adverse Reactions







 Name  Dates  Details

 

 No Known Drug Allergies    Status:Active







Past Medical History







 Name  Dates  Details

 

 Head injury(959.01, S09.90XA)    Status:Active

 

 History of Acute upper respiratory infection(465.9, J06.9)    Status:Resolved

 

 History of Cough(786.2, R05)    Status:Resolved

 

 History of essential hypertension(V12.59, Z86.79)    Status:Resolved

 

 History of hyperlipidemia(V12.29, Z86.39)    Status:Resolved

 

 History of hypothyroidism(V12.29, Z86.39)    Status:Resolved

 

 History of obesity(V13.89, Z86.39)    Status:Resolved







Procedures







 Procedure  Dates  Details

 

 History of Brain Surgery    

 

 History of Inguinal Hernia Repair    

 

 History of Creation Of Ventriculo-Peritoneal CSF Shunt    

 

 Procedures not documented    







Immunization







 Name  Dates  Details

 

 Influenza  Administered on:20-Oct-2011  



 Lot #:[id number]    

 

 Influenza  Administered on:2014  



 Lot #:[id number]    

 

 Fluzone Quadrivalent 0.5 ML Intramuscular Suspension  Administered on:2016  



 Lot #:[id number]    

 

 Fluzone Quadrivalent 0.5 ML Intramuscular Suspension Prefilled Syringe  
Administered on:26-Oct-2016  



 Lot #:[id number]    







Family History

Mother





 Name  Dates  Details

 

 Family history of dementia(V17.2, Z81.8)    Status:Active

 

 Family history of depression(V17.0, Z81.8)    Status:Active



Father





 Name  Dates  Details

 

 Family history of dementia(V17.2, Z81.8)    Status:Active

 

 Family history of Alcohol dependence(303.90, F10.20)    Status:Active



Sister





 Name  Dates  Details

 

 Family history of depression(V17.0, Z81.8)    Status:Active



Brother





 Name  Dates  Details

 

 Family history of depression(V17.0, Z81.8)    Status:Active







Social History







 Name  Dates  Details

 

 -    



Smoking StatusNever smoker



Vital Signs







 Date  Test  Result  Details

 

       

 

   No Known Vitals to report    



       







Results







 Date  Description  Value  Details

 

   Results not documented    



       

 

       







Plan of Care

Planned Observations





 Name  Dates  Details

 

 Planned Goals not documented    Goal



Planned Medications





 Name  Dates  Details

 

 Rosuvastatin Calcium 10 MG Oral Tablet  Ordered:8-Sep-2016  Active

 

 Fenofibrate 145 MG Oral Tablet  Ordered:13-Sep-2016  Active







Interventions Provided

Labs/Procedures/Imaging[QL] TSH, 3RD GENERATION W/REFLEX TO FT4; To be Done: 
2016InstructionsPatient Specific Education Given; Done:Medications/
Immunizations AdministeredFluzone Quadrivalent 0.5 ML Intramuscular Suspension; 
Done: 2016



Instructions

Instructions not documented



Encounters







 Appointment; YOLANDA MAGDALENO  [id number]



 Encounter Diagnosis:Problem not documented  11:00

 

 Appointment; Keith Perez  



 Encounter Diagnosis:Problem not documented  14:00

 

 Appointment; HAMMAD DASH  On25-Mar-2015



 Encounter Diagnosis:Problem not documented  11:00

## 2017-10-26 NOTE — CT
CT HEAD WITHOUT CONTRAST: 

 

Technique: Multiple tomograms obtained through head without IV enhancement. 

 

History: Hydrocephalus. 

 

Comparison: 9-28-17

 

FINDINGS: 

Volume loss is again noted in the right middle cranial fossa in both inferior frontal lobes. Mild vo
lume loss in the left middle cranial fossa also noted. Shunt catheter is again seen entering via the
 right parietal lobe. This catheter crosses the midline and the tip of the catheter extends beyond t
he wall of the left lateral ventricle. It is unchanged in position from 9-28-17. The ventricles are 
upper normal size and stable when compared to 9-28-17. There are post op changes involving the skull
. 

 

IMPRESSION: 

Stable findings when compared to 9-28-17. 

 

 

POS: [facility]

## 2018-03-30 ENCOUNTER — HOSPITAL ENCOUNTER (EMERGENCY)
Dept: HOSPITAL 92 - ERS | Age: 43
Discharge: HOME | End: 2018-03-30
Payer: MEDICARE

## 2018-03-30 DIAGNOSIS — F32.9: ICD-10-CM

## 2018-03-30 DIAGNOSIS — E78.00: ICD-10-CM

## 2018-03-30 DIAGNOSIS — R47.1: Primary | ICD-10-CM

## 2018-03-30 DIAGNOSIS — Z79.899: ICD-10-CM

## 2018-03-30 DIAGNOSIS — F41.9: ICD-10-CM

## 2018-03-30 LAB
ALBUMIN SERPL BCG-MCNC: 4.1 G/DL (ref 3.5–5)
ALP SERPL-CCNC: 48 U/L (ref 40–150)
ALT SERPL W P-5'-P-CCNC: 23 U/L (ref 8–55)
ANION GAP SERPL CALC-SCNC: 9 MMOL/L (ref 10–20)
APAP SERPL-MCNC: (no result) MCG/ML (ref 10–30)
APTT PPP: 29.3 SEC (ref 22.9–36.1)
AST SERPL-CCNC: 27 U/L (ref 5–34)
BASOPHILS # BLD AUTO: 0.1 THOU/UL (ref 0–0.2)
BASOPHILS NFR BLD AUTO: 1.1 % (ref 0–1)
BILIRUB SERPL-MCNC: 0.3 MG/DL (ref 0.2–1.2)
BUN SERPL-MCNC: 16 MG/DL (ref 8.9–20.6)
CALCIUM SERPL-MCNC: 9.5 MG/DL (ref 7.8–10.44)
CHLORIDE SERPL-SCNC: 102 MMOL/L (ref 98–107)
CO2 SERPL-SCNC: 33 MMOL/L (ref 22–29)
CREAT CL PREDICTED SERPL C-G-VRATE: 0 ML/MIN (ref 70–130)
DRUG SCREEN CUTOFF: (no result)
EOSINOPHIL # BLD AUTO: 0.3 THOU/UL (ref 0–0.7)
EOSINOPHIL NFR BLD AUTO: 3.1 % (ref 0–10)
GLOBULIN SER CALC-MCNC: 2.5 G/DL (ref 2.4–3.5)
GLUCOSE SERPL-MCNC: 94 MG/DL (ref 70–105)
HGB BLD-MCNC: 13.3 G/DL (ref 14–18)
INR PPP: 1.1
LYMPHOCYTES # BLD: 3.7 THOU/UL (ref 1.2–3.4)
LYMPHOCYTES NFR BLD AUTO: 42.3 % (ref 21–51)
MCH RBC QN AUTO: 33 PG (ref 27–31)
MCV RBC AUTO: 102 FL (ref 80–94)
MEDTOX CONTROL LINE VALID?: (no result)
MEDTOX READER #: (no result)
MONOCYTES # BLD AUTO: 1.1 THOU/UL (ref 0.11–0.59)
MONOCYTES NFR BLD AUTO: 13 % (ref 0–10)
NEUTROPHILS # BLD AUTO: 3.5 THOU/UL (ref 1.4–6.5)
NEUTROPHILS NFR BLD AUTO: 40.5 % (ref 42–75)
PLATELET # BLD AUTO: 250 THOU/UL (ref 130–400)
POTASSIUM SERPL-SCNC: 3.8 MMOL/L (ref 3.5–5.1)
PROTHROMBIN TIME: 14.5 SEC (ref 12–14.7)
RBC # BLD AUTO: 4.03 MILL/UL (ref 4.7–6.1)
SALICYLATES SERPL-MCNC: (no result) MG/DL (ref 15–30)
SODIUM SERPL-SCNC: 140 MMOL/L (ref 136–145)
SP GR UR STRIP: 1.01 (ref 1–1.04)
WBC # BLD AUTO: 8.7 THOU/UL (ref 4.8–10.8)

## 2018-03-30 PROCEDURE — 85730 THROMBOPLASTIN TIME PARTIAL: CPT

## 2018-03-30 PROCEDURE — 93005 ELECTROCARDIOGRAM TRACING: CPT

## 2018-03-30 PROCEDURE — 80306 DRUG TEST PRSMV INSTRMNT: CPT

## 2018-03-30 PROCEDURE — 70450 CT HEAD/BRAIN W/O DYE: CPT

## 2018-03-30 PROCEDURE — 80053 COMPREHEN METABOLIC PANEL: CPT

## 2018-03-30 PROCEDURE — 81003 URINALYSIS AUTO W/O SCOPE: CPT

## 2018-03-30 PROCEDURE — 84443 ASSAY THYROID STIM HORMONE: CPT

## 2018-03-30 PROCEDURE — 85610 PROTHROMBIN TIME: CPT

## 2018-03-30 PROCEDURE — 80307 DRUG TEST PRSMV CHEM ANLYZR: CPT

## 2018-03-30 PROCEDURE — 85025 COMPLETE CBC W/AUTO DIFF WBC: CPT

## 2018-03-30 PROCEDURE — 75809 NONVASCULAR SHUNT X-RAY: CPT

## 2018-03-30 PROCEDURE — 36416 COLLJ CAPILLARY BLOOD SPEC: CPT

## 2018-03-30 PROCEDURE — 83735 ASSAY OF MAGNESIUM: CPT

## 2018-03-30 NOTE — CT
CT BRAIN NONCONTRAST:

 

DATE:  03/30/18

TIME:  1203 hours

 

HISTORY:

42-year-old male with acute stroke symptoms: dysarthria. 

 

This STAT stroke alert protocol CT report was called to Dr. Mcclendon of the emergency department at 1207 
hours on 03/30/18. 

 

COMPARISON:

10/26/17. 

 

FINDINGS:

There is no interval change. Moderate to large symmetrical regions of bilateral anterior inferior fro
ntal lobe encephalomalacia and gliosis representing sequelae of prior trauma. Moderately large region
 of right anterior temporal lobe encephalomalacia and gliosis. Old right frontotemporal craniotomy ch
anges. Old left parietal craniotomy changes.  shunt catheter enters through a right parietal bur ho
le, traversing the midline posterior to the splenium of the corpus callosum, traversing the posterior
 body of the left lateral ventricle, with distal tip embedded in the region of the posterior limb of 
the left internal capsule. No significant ventriculomegaly. No acute intra-axial or extra-axial hemor
rhage. No mass effect, midline shift, or extra-axial fluid collection. 

 

IMPRESSION:

1.  No interval change compared to 10/26/17. 

2.  Ventriculoperitoneal shunt catheter. 

3.  Bilateral old craniotomy changes. 

4.  Moderately large regions of old traumatic brain injury: bilateral frontal lobes and right tempora
l lobe. 

 

CODE CR. 

 

 

 

JN JODI  

 

POS: [facility]

## 2018-03-30 NOTE — RAD
RADIOGRAPH SHUNTOGRAM

HEAD 2 VIEW

NECK 1 VIEW

CHEST 1 VIEW

ABDOMEN 1 VIEW:

 

DATE: 3/30/18.

 

HISTORY: 

A 42-year-old male with altered mental status.

 

COMPARISON: 

Shuntogram of 9/28/17.

 

FINDINGS: 

Left pterional and right parietal old craniotomy changes.   shunt catheter enters the intracranial 
cavity through a right parietal mary hole, with distal tip to the left of midline.  There is a long r
adiolucent (radiographically invisible) segment of the  shunt catheter from the level of the pariet
al bone to the C3-4 level of the right neck soft tissues.  The catheter descends the right chest, wit
h a short distal portion overlying the right hemidiaphragm.  There is chronic blunting of the right l
ateral costophrenic angle suggestive of a chronic small right pleural effusion (versus pleural thicke
mary anne).  There is architectural distortion and pulmonary scarring at the right medial lung base, uncha
nged.  The rest of the visualized lung fields are clear.  Only the upper aspect of the abdomen was im
aged.  A moderate amount of colonic stool.  

 

IMPRESSION: 

1.  Right-sided ventriculoperitoneal shunt catheter with a short distal portion projecting over the r
ight hemidiaphragm.  It is uncertain whether this  shunt catheter is in the right pleural space or 
within the peritoneal cavity.  

 

2.  The presence of what appears to be a persistent, chronic small right pleural effusion raises the 
possibility that the catheter does terminate in the pleural space.

 

3.  Pulmonary scar at the right medial lung base.

 

4.  No evidence of disruption of the radiopaque portions of the ventriculoperitoneal shunt catheter. 
 The long radiolucent segment in the neck cannot be evaluated.

 

POS: [facility]

## 2018-04-18 NOTE — EKG
Test Reason : ER INDICATION

Blood Pressure : ***/*** mmHG

Vent. Rate : 066 BPM     Atrial Rate : 066 BPM

   P-R Int : 154 ms          QRS Dur : 100 ms

    QT Int : 406 ms       P-R-T Axes : 030 050 031 degrees

   QTc Int : 425 ms

 

Normal sinus rhythm

Normal ECG

 

Confirmed by GAGE CARRASCO ([id number]),  MARYLIN DELUNA ([id number]) on 4/18/2018 3:16:37 PM

 

Referred By:             Confirmed By:GAGE CARRASCO

## 2018-04-24 ENCOUNTER — HOSPITAL ENCOUNTER (OUTPATIENT)
Dept: HOSPITAL 92 - ULT | Age: 43
Discharge: HOME | End: 2018-04-24
Attending: FAMILY MEDICINE
Payer: MEDICARE

## 2018-04-24 DIAGNOSIS — R47.81: ICD-10-CM

## 2018-04-24 DIAGNOSIS — Z98.890: ICD-10-CM

## 2018-04-24 DIAGNOSIS — I07.1: ICD-10-CM

## 2018-04-24 DIAGNOSIS — I87.2: Primary | ICD-10-CM

## 2018-04-24 DIAGNOSIS — Z98.2: ICD-10-CM

## 2018-04-24 DIAGNOSIS — J90: ICD-10-CM

## 2018-04-24 PROCEDURE — 70553 MRI BRAIN STEM W/O & W/DYE: CPT

## 2018-04-24 PROCEDURE — 93880 EXTRACRANIAL BILAT STUDY: CPT

## 2018-04-24 PROCEDURE — 93306 TTE W/DOPPLER COMPLETE: CPT

## 2018-04-24 NOTE — ULT
ULTRASOUND CAROTID DOPPLER STANDARD:

 

HISTORY: 

Slurred speech. 

 

COMPARISON: 

None.

 

TECHNIQUE: 

Real-time, gray scale, color flow, and spectral analysis of the extracranial carotid and vertebral ar
teries is performed with a linear ray transducer.  Antegrade flow both vertebral arteries.  No hemody
namically significant stenosis within the internal carotid arteries.  Right ICA/CCA ratio is 0.98 and
 left ICA/CCA ratio 0.61.

 

IMPRESSION: 

No hemodynamically significant stenosis.

 

POS: [facility]

## 2018-04-25 NOTE — MRI
MRI BRAIN WITH AND WITHOUT CONTRAST:

 

Technique: Multiplanar, multisequence imaging of the brain obtained. Post contrast images obtained af
ter administration of 15 cc of MultiHance IV. 

 

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 New stroke symptoms. Mental status c
hange. 

 

Comparison: CT brain 3-30-18. 

 

FINDINGS: 

Ventricular size is upper normal and stable from 3-30-18. Shunt catheter via the right parietal lobe 
is again noted. The large areas of encephalomalacia involving both frontal lobes and right temporal l
obe which have been previously described. Post op craniotomy changes are noted involving the left par
ietal region. White matter gliosis is seen in both frontal lobes. No restricted diffusion. No evidenc
e of acute infarct or mass. 

 

Intracranial internal carotid arteries and proximal cerebral arteries and basilar arteries show flow 
voids. No abnormal enhancement identified. Mucosal edema in the left maxillary sinus. 

 

IMPRESSION: 

Post op and post-traumatic changes are again noted in the brain. Ventricular size is stable from the 
prior CT of 3-30-18. No evidence of acute infarct, hemorrhage, or mass. 

 

POS: [facility]

## 2018-06-11 ENCOUNTER — HOSPITAL ENCOUNTER (INPATIENT)
Dept: HOSPITAL 92 - ERS | Age: 43
LOS: 5 days | Discharge: ANOTHER HOSPITAL | DRG: 871 | End: 2018-06-16
Attending: FAMILY MEDICINE | Admitting: FAMILY MEDICINE
Payer: MEDICARE

## 2018-06-11 VITALS — BODY MASS INDEX: 35.1 KG/M2

## 2018-06-11 DIAGNOSIS — Z23: ICD-10-CM
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
## 2018-06-11 DIAGNOSIS — F32.9: ICD-10-CM

## 2018-06-11 DIAGNOSIS — A41.9: Primary | ICD-10-CM

## 2018-06-11 DIAGNOSIS — J18.9: ICD-10-CM
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
## 2018-06-11 DIAGNOSIS — L03.115: ICD-10-CM

## 2018-06-11 DIAGNOSIS — E86.0: ICD-10-CM

## 2018-06-11 DIAGNOSIS — J90: ICD-10-CM

## 2018-06-11 DIAGNOSIS — Z79.899: ICD-10-CM

## 2018-06-11 DIAGNOSIS — Z99.81: ICD-10-CM

## 2018-06-11 DIAGNOSIS — Z98.2: ICD-10-CM

## 2018-06-11 DIAGNOSIS — F79: ICD-10-CM

## 2018-06-11 DIAGNOSIS — Z87.820: ICD-10-CM

## 2018-06-11 DIAGNOSIS — E87.1: ICD-10-CM

## 2018-06-11 DIAGNOSIS — E87.6: ICD-10-CM

## 2018-06-11 DIAGNOSIS — Z79.82: ICD-10-CM

## 2018-06-11 DIAGNOSIS — F29: ICD-10-CM

## 2018-06-11 DIAGNOSIS — B96.89: ICD-10-CM

## 2018-06-11 DIAGNOSIS — S81.831A: ICD-10-CM

## 2018-06-11 DIAGNOSIS — E03.9: ICD-10-CM

## 2018-06-11 DIAGNOSIS — K59.00: ICD-10-CM

## 2018-06-11 LAB
ALBUMIN SERPL BCG-MCNC: 3.8 G/DL (ref 3.5–5)
ALP SERPL-CCNC: 44 U/L (ref 40–150)
ALT SERPL W P-5'-P-CCNC: 16 U/L (ref 8–55)
ANALYZER IN CARDIO: (no result)
ANION GAP SERPL CALC-SCNC: 12 MMOL/L (ref 10–20)
AST SERPL-CCNC: 19 U/L (ref 5–34)
BASE EXCESS STD BLDA CALC-SCNC: -2.3 MEQ/L
BILIRUB SERPL-MCNC: 0.4 MG/DL (ref 0.2–1.2)
BUN SERPL-MCNC: 14 MG/DL (ref 8.9–20.6)
CA-I BLDA-SCNC: 1.2 MMOL/L (ref 1.12–1.3)
CALCIUM SERPL-MCNC: 9.8 MG/DL (ref 7.8–10.44)
CHLORIDE SERPL-SCNC: 97 MMOL/L (ref 98–107)
CO2 SERPL-SCNC: 22 MMOL/L (ref 22–29)
CREAT CL PREDICTED SERPL C-G-VRATE: 0 ML/MIN (ref 70–130)
CRYSTAL-AUWI FLAG: 0.1 (ref 0–15)
GLOBULIN SER CALC-MCNC: 3.2 G/DL (ref 2.4–3.5)
GLUCOSE SERPL-MCNC: 101 MG/DL (ref 70–105)
HCO3 BLDA-SCNC: 22.1 MEQ/L (ref 22–28)
HCT VFR BLDA CALC: 36.6 % (ref 42–52)
HEV IGM SER QL: 9.4 (ref 0–7.99)
HGB BLD-MCNC: 12.9 G/DL (ref 14–18)
HGB BLDA-MCNC: 10.9 G/DL (ref 14–18)
HYALINE CASTS #/AREA URNS LPF: (no result) LPF
MCH RBC QN AUTO: 31.8 PG (ref 27–31)
MCV RBC AUTO: 97.3 FL (ref 80–94)
MDIFF COMPLETE?: YES
O2 A-A PPRESDIFF RESPIRATORY: 89.53 MM[HG] (ref 0–20)
PATHC CAST-AUWI FLAG: 0.14 (ref 0–2.49)
PCO2 BLDA: 36.5 MMHG (ref 35–45)
PH BLDA: 7.4 [PH] (ref 7.35–7.45)
PLATELET # BLD AUTO: 237 THOU/UL (ref 130–400)
PLATELET BLD QL SMEAR: (no result)
PO2 BLDA: 93 MMHG (ref 80–100)
POTASSIUM SERPL-SCNC: 3.6 MMOL/L (ref 3.5–5.1)
RBC # BLD AUTO: 4.05 MILL/UL (ref 4.7–6.1)
RBC UR QL AUTO: (no result) HPF (ref 0–3)
REFLEX FOR REVIEW??: NO
SODIUM SERPL-SCNC: 127 MMOL/L (ref 136–145)
SP GR UR STRIP: 1.02 (ref 1–1.04)
SPECIMEN DRAWN FROM PATIENT: (no result)
SPERM-AUWI FLAG: 0 (ref 0–9.9)
TOXIC GRANULES BLD QL SMEAR: SLIGHT
WBC # BLD AUTO: 27.8 THOU/UL (ref 4.8–10.8)
WBC UR QL AUTO: (no result) HPF (ref 0–3)
YEAST-AUWI FLAG: 0 (ref 0–25)

## 2018-06-11 PROCEDURE — 93005 ELECTROCARDIOGRAM TRACING: CPT

## 2018-06-11 PROCEDURE — 81003 URINALYSIS AUTO W/O SCOPE: CPT

## 2018-06-11 PROCEDURE — 84443 ASSAY THYROID STIM HORMONE: CPT

## 2018-06-11 PROCEDURE — 80048 BASIC METABOLIC PNL TOTAL CA: CPT

## 2018-06-11 PROCEDURE — 87086 URINE CULTURE/COLONY COUNT: CPT

## 2018-06-11 PROCEDURE — 83735 ASSAY OF MAGNESIUM: CPT

## 2018-06-11 PROCEDURE — 83605 ASSAY OF LACTIC ACID: CPT

## 2018-06-11 PROCEDURE — A4216 STERILE WATER/SALINE, 10 ML: HCPCS

## 2018-06-11 PROCEDURE — 94760 N-INVAS EAR/PLS OXIMETRY 1: CPT

## 2018-06-11 PROCEDURE — 86140 C-REACTIVE PROTEIN: CPT

## 2018-06-11 PROCEDURE — 96367 TX/PROPH/DG ADDL SEQ IV INF: CPT

## 2018-06-11 PROCEDURE — 36415 COLL VENOUS BLD VENIPUNCTURE: CPT

## 2018-06-11 PROCEDURE — 82805 BLOOD GASES W/O2 SATURATION: CPT

## 2018-06-11 PROCEDURE — 96365 THER/PROPH/DIAG IV INF INIT: CPT

## 2018-06-11 PROCEDURE — 81015 MICROSCOPIC EXAM OF URINE: CPT

## 2018-06-11 PROCEDURE — 71045 X-RAY EXAM CHEST 1 VIEW: CPT

## 2018-06-11 PROCEDURE — 80053 COMPREHEN METABOLIC PANEL: CPT

## 2018-06-11 PROCEDURE — 80202 ASSAY OF VANCOMYCIN: CPT

## 2018-06-11 PROCEDURE — 3E0234Z INTRODUCTION OF SERUM, TOXOID AND VACCINE INTO MUSCLE, PERCUTANEOUS APPROACH: ICD-10-PCS | Performed by: FAMILY MEDICINE

## 2018-06-11 PROCEDURE — 90715 TDAP VACCINE 7 YRS/> IM: CPT

## 2018-06-11 PROCEDURE — 85025 COMPLETE CBC W/AUTO DIFF WBC: CPT

## 2018-06-11 PROCEDURE — 87040 BLOOD CULTURE FOR BACTERIA: CPT

## 2018-06-11 RX ADMIN — VANCOMYCIN HYDROCHLORIDE SCH MLS: 1 INJECTION, SOLUTION INTRAVENOUS at 22:23

## 2018-06-11 NOTE — HP
DATE OF ADMISSION:  06/11/2018

 

CHIEF COMPLAINT:  Right leg redness and pain.

 

HISTORY OF PRESENT ILLNESS:  This is a 42-year-old  male patient of Dr. Norris Bynum, who
 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  He has been recently ro
aming in the woods causing some scratches to his arms and legs and his sister who is his medical anna
r of  and caretaker noticed that his right lower leg had becoming more red and swollen.  She 
took him in to see Dr. Bynum at the time there were minimal signs of infection, just the swelling,
 so no specific treatment was undertaken and today that the redness became much more pronounced and s
welling was worse and he started to have more significant pain in the leg, so she brought him to the 
emergency room.  He was found to be hypotensive, tachycardic, febrile and tachypneic, so he was being
 admitted for sepsis from the cellulitis.  He denies any other associated problems.  He does not have
 any visual problems.  Denies any troubles with cough or nausea or vomiting.  Denies any other pain o
ther than in the leg.

 

PAST MEDICAL HISTORY:  He had a traumatic brain injury from a fall into a manhole cover when he was [age] years old at about 20 years ago, he has a  shunt as a result of that.  Does have the mental retar
dation from this traumatic brain injury as well as some surgeries associated with that.  He spent luis
e in [place] as he was life-flighted down there for this, 20 years ago.  Hypothyroidism.

 

PAST SURGICAL HISTORY:  He had a lobectomy for the traumatic brain injury with resection of the ische
harley brain tissue at that time.  He had a  shunt placed two separate times.  They are not sure when 
the last time it was evaluated.

 

ALLERGIES:  No known drug allergies.

 

CURRENT MEDICATIONS:  He is on a baby aspirin once a day.  He is on a Vascepa 1 g twice a day.  Dival
proex 500 mg, he takes three of those twice a day, so 1500 mg twice a day.  He is on fenofibrate 150 
mg daily, sertraline 100 mg twice a day, olanzapine 20 mg at bedtime and 5 mg also at bedtime.  He is
 on levothyroxine 125 mcg once a day.  Also he takes Crestor 10 mg daily.

 

FAMILY HISTORY:  Positive for diabetes.  No other known chronic diseases in the family.

 

SOCIAL HISTORY:  He lives with his sister who is his medical power of  and she has a place in
 the country somewhere between [place] and [place].  He has no toxic habits.  The biggest social pro
blem they have with him is that he gets fed up and will leave the house without notice and not tell a
nyone where he is going, most of the time it is to try to find a unlimited source of food.

 

REVIEW OF SYSTEMS:  He has had fever, denies any chills.  Denies any headache or visual changes.  No 
troubles chewing or swallowing.  Denies any cough or chest pain.  Minimal nausea, but no emesis.  Den
ies any changes in his bowel or bladder habits.  He does note the pain to his right lower leg.  It hu
rts to walk, hurts to bend the knee.  Denies any paresis or paresthesias.  No seizure activity.

 

PHYSICAL EXAMINATION:

VITAL SIGNS:  Currently, 98.1, pulse 104, BP is 133/65, 30 respiration per minute.  He is on 2 liters
 by nasal cannula O2.

HEENT:  He is normocephalic, atraumatic appearing cranium other than the surgical scars.  So those ar
e old and healed.  His  shunt is minimally palpable.  Pupils are equal, round, and reactive to ligh
t and accommodation.  Extraocular movements are intact.  Mucous membranes are moist.

NECK:  Supple, no JVD, no bruits, no thyromegaly.  Neck has full range of motion.

LUNGS:  Clear to auscultation bilaterally with no rales, rhonchi or wheezes.

HEART:  S1, S2, with no rubs, murmurs, or gallops.

ABDOMEN:  Soft, nontender, nondistended, slightly obese with no masses and no hepatosplenomegaly.  Brian
wel sounds are hypoactive at this time.

GENITOURINARY:  Deferred.

EXTREMITIES:  There are palpable pulses in all 4 extremities.  The right lower leg has obvious erythe
ma with several wounds in all surfaces of the lower leg.  There are several wounds and scratch marks.
  He also has an obvious phlebitis line meandering up the right thigh.  The right knee when trying to
 flex causes significant pain, but he is able to move his toes without issue.

NEUROMUSCULAR:  He is alert and oriented x4, although the awareness is he knows where he is.  He know
s why he is here.  He has moderate, but functional MR.  He has no motor or sensory deficits.  No suic
idal or homicidal ideations.  Denies any auditory or visual hallucinations.

 

LABORATORY AND X-RAY FINDINGS:  White count elevated at 27.8, H and H is 12.9 and 39.4 respectively w
ith his platelets at 237,000, neutrophils 35, lymphocytes at 7 and bands at 41.  His ABG shows a pH 7
.4 the pCO2 at 36.5, and a pO2 of 97.  Sodium slightly low at 127, potassium 3.6, chloride 97, bicarb
onur is 22, BUN is 14, creatinine 0.96, glucose at 101.  AST is 19, ALT is 16.  Lactase is slightly 
elevated at 1.8.  Ultrasound of the leg was negative for DVT.  Chest x-ray showed no active disease, 
but the shunt line was seen.

 

ASSESSMENT:  Sepsis with lower extremity cellulitis with phlebitis to the thigh.  He has a known  s
hunt with a history of traumatic brain injury associated MR.  We will continue the vancomycin and Zos
yn that was started in the ER and would make changes as needed.  We will also continue his home meds.
  Dr. Bynum will assume care in the morning.

## 2018-06-11 NOTE — ULT
RIGHT LOWER EXTREMITY VENOUS DOPPLER ULTRASOUND:

 

Date:  06/11/18 

 

COMPARISON:  

None. 

 

HISTORY:  

Right lower extremity pain, edema, and swelling. Evaluate for deep venous thrombosis. 

 

TECHNIQUE:  

Multiplanar Gray scale sonographic imaging of the venous structures of the right lower extremity obta
ined with color flow and spectral analysis. 

 

FINDINGS:

Right common femoral vein, greater saphenous vein, profunda femoral vein, femoral vein, popliteal vei
n, and posterior tibial vein are patent. There is normal blood flow, augmentation, and compression wi
thin the deep venous system on the right, with no evidence for deep venous thrombosis. 

 

IMPRESSION: 

No evidence for deep venous thrombosis of the right lower extremity. 

 

 

POS: [facility]

## 2018-06-11 NOTE — RAD
PORTABLE CHEST ONE VIEW:

 

Date: 6-11-18 

Time: 11:00 a.m.

 

History: Chest pain. 

 

FINDINGS/IMPRESSION: 

The heart size is normal. The lungs are expanded without lobar consolidation, pneumothoraces, or larg
e effusions. There is no evidence of keny pleural edema. Right sided shunt tube is present. 

 

POS: [facility]

## 2018-06-12 LAB
ALBUMIN SERPL BCG-MCNC: 3.1 G/DL (ref 3.5–5)
ALP SERPL-CCNC: 54 U/L (ref 40–150)
ALT SERPL W P-5'-P-CCNC: 13 U/L (ref 8–55)
ANION GAP SERPL CALC-SCNC: 11 MMOL/L (ref 10–20)
ANION GAP SERPL CALC-SCNC: 12 MMOL/L (ref 10–20)
AST SERPL-CCNC: 16 U/L (ref 5–34)
BILIRUB SERPL-MCNC: 0.4 MG/DL (ref 0.2–1.2)
BUN SERPL-MCNC: 10 MG/DL (ref 8.9–20.6)
BUN SERPL-MCNC: 9 MG/DL (ref 8.9–20.6)
CALCIUM SERPL-MCNC: 8.9 MG/DL (ref 7.8–10.44)
CALCIUM SERPL-MCNC: 9.3 MG/DL (ref 7.8–10.44)
CHLORIDE SERPL-SCNC: 97 MMOL/L (ref 98–107)
CHLORIDE SERPL-SCNC: 99 MMOL/L (ref 98–107)
CO2 SERPL-SCNC: 22 MMOL/L (ref 22–29)
CO2 SERPL-SCNC: 23 MMOL/L (ref 22–29)
CREAT CL PREDICTED SERPL C-G-VRATE: 189 ML/MIN (ref 70–130)
CREAT CL PREDICTED SERPL C-G-VRATE: 191 ML/MIN (ref 70–130)
CRP SERPL-MCNC: 31.77 MG/DL
GLOBULIN SER CALC-MCNC: 3 G/DL (ref 2.4–3.5)
GLUCOSE SERPL-MCNC: 85 MG/DL (ref 70–105)
GLUCOSE SERPL-MCNC: 99 MG/DL (ref 70–105)
HGB BLD-MCNC: 11 G/DL (ref 14–18)
MCH RBC QN AUTO: 33 PG (ref 27–31)
MCV RBC AUTO: 96.5 FL (ref 80–94)
MDIFF COMPLETE?: YES
PLATELET # BLD AUTO: 192 THOU/UL (ref 130–400)
POTASSIUM SERPL-SCNC: 3.2 MMOL/L (ref 3.5–5.1)
POTASSIUM SERPL-SCNC: 3.3 MMOL/L (ref 3.5–5.1)
RBC # BLD AUTO: 3.33 MILL/UL (ref 4.7–6.1)
SODIUM SERPL-SCNC: 128 MMOL/L (ref 136–145)
SODIUM SERPL-SCNC: 130 MMOL/L (ref 136–145)
WBC # BLD AUTO: 27.4 THOU/UL (ref 4.8–10.8)

## 2018-06-12 RX ADMIN — VANCOMYCIN HYDROCHLORIDE SCH: 1 INJECTION, POWDER, LYOPHILIZED, FOR SOLUTION INTRAVENOUS at 19:36

## 2018-06-12 RX ADMIN — VANCOMYCIN HYDROCHLORIDE SCH MLS: 1 INJECTION, POWDER, LYOPHILIZED, FOR SOLUTION INTRAVENOUS at 20:04

## 2018-06-12 RX ADMIN — VANCOMYCIN HYDROCHLORIDE SCH MLS: 1 INJECTION, SOLUTION INTRAVENOUS at 11:02

## 2018-06-12 RX ADMIN — VANCOMYCIN HYDROCHLORIDE SCH MLS: 1 INJECTION, POWDER, LYOPHILIZED, FOR SOLUTION INTRAVENOUS at 17:52

## 2018-06-12 NOTE — PRG
DATE OF SERVICE:  06/12/2018

 

HISTORY OF PRESENT ILLNESS:  The patient remains tachypneic and tachycardic.  He is fatigued and has 
decreased appetite.  He is easily arousable and oriented.  No psychotic episodes have been reported. 
 Mother is at bedside.  The right lower extremity remains warm and painful to touch.

 

VITAL SIGNS:  This a.m., include temperature of 98.6, pulse of 101, respiratory rate of 20, oxygen sa
turation 100% on 2 liters nasal cannula, blood pressure 127/65.

 

LABORATORY DATA:  Sodium 130, potassium of 3.3, creatinine of 0.77.  CRP of 31.  White blood cell cou
nt 27.4, hemoglobin 11.0, platelet count of 192.  Review of ABG from yesterday; pH of 7.4, pCO2 of 36
.5, PaO2 of 93.

 

PHYSICAL EXAMINATION: 

GENERAL:  The patient is arousable, oriented and mild increased work of breathing.

HEENT:  Head is normocephalic, atraumatic.  Extraocular movements are intact.  Sclerae are clear.  Or
al mucosa is moist.  Nasal cannula is in place.

NECK:  Supple.

HEART:  Tachycardic.  No murmurs auscultated.

LUNGS:  Clear to auscultation bilaterally.  No rubs or wheezes.

ABDOMEN:  Soft, protuberant, positive bowel sounds throughout.

EXTREMITIES:  1+ dorsalis pedis pulses bilaterally.  Good capillary refill.  Right lower extremity kn
ee down is with notable erythema and pallor with trace pitting edema, several ulcerations with crust 
scab present bilateral lower extremities.

 

ASSESSMENT AND PLAN:

1.  Sepsis secondary to right lower extremity wound suffered from barbed wire.  The patient has been 
given a tetanus shot.  

2.  Hypokalemia, hyponatremia.  The patient is receiving IV fluids and potassium replacement.  Follow
 up on labs daily.

3.  History of traumatic brain injury, status post a  shunt.  Prior evaluations on outpatient gavin
p in the last year.  His shunt has been patent.  It does empty into his chest cavity with chronic sma
ll pleural effusion that may be playing a part of his tachypnea, but likely secondary to sepsis as ab
ove.  The patient is stable on nasal cannula currently.

4.  Depression with psychosis.  Continue the patient's Depakote, Zyprexa and sertraline.  

 

We will continue to follow up if anything trends on the white blood cell count, may adjust antibiotic
s within the first 72 hours.  Recommend the patient remain on telemetry for closer observation until 
his vital signs stabilize regarding heart rate and tachypnea prior to transfer to the floor.

## 2018-06-13 LAB
ANION GAP SERPL CALC-SCNC: 11 MMOL/L (ref 10–20)
BUN SERPL-MCNC: 8 MG/DL (ref 8.9–20.6)
CALCIUM SERPL-MCNC: 9.5 MG/DL (ref 7.8–10.44)
CHLORIDE SERPL-SCNC: 102 MMOL/L (ref 98–107)
CO2 SERPL-SCNC: 27 MMOL/L (ref 22–29)
CREAT CL PREDICTED SERPL C-G-VRATE: 215 ML/MIN (ref 70–130)
CRP SERPL-MCNC: 33.52 MG/DL
GLUCOSE SERPL-MCNC: 82 MG/DL (ref 70–105)
HGB BLD-MCNC: 11 G/DL (ref 14–18)
MAGNESIUM SERPL-MCNC: 1.9 MG/DL (ref 1.6–2.6)
MCH RBC QN AUTO: 33.7 PG (ref 27–31)
MCV RBC AUTO: 96.8 FL (ref 80–94)
MDIFF COMPLETE?: YES
PLATELET # BLD AUTO: 173 THOU/UL (ref 130–400)
PLATELET BLD QL SMEAR: (no result)
POLYCHROMASIA BLD QL SMEAR: (no result) (100X)
POTASSIUM SERPL-SCNC: 3.6 MMOL/L (ref 3.5–5.1)
RBC # BLD AUTO: 3.27 MILL/UL (ref 4.7–6.1)
SODIUM SERPL-SCNC: 136 MMOL/L (ref 136–145)
TOXIC GRANULES BLD QL SMEAR: SLIGHT
VANCOMYCIN TROUGH SERPL-MCNC: 9.9 UG/ML
WBC # BLD AUTO: 26.4 THOU/UL (ref 4.8–10.8)

## 2018-06-13 RX ADMIN — VANCOMYCIN HYDROCHLORIDE SCH MLS: 1 INJECTION, POWDER, LYOPHILIZED, FOR SOLUTION INTRAVENOUS at 04:50

## 2018-06-13 RX ADMIN — VANCOMYCIN HYDROCHLORIDE SCH MLS: 1 INJECTION, POWDER, LYOPHILIZED, FOR SOLUTION INTRAVENOUS at 20:33

## 2018-06-13 RX ADMIN — VANCOMYCIN HYDROCHLORIDE SCH MLS: 1 INJECTION, POWDER, LYOPHILIZED, FOR SOLUTION INTRAVENOUS at 12:57

## 2018-06-13 NOTE — PRG
DATE OF SERVICE:  06/13/2018

 

HISTORY OF PRESENT ILLNESS:  The patient is still requiring oxygen per nasal cannula to remain comfor
table.  He has had better breathing status, has not had any cough or sputum production, still remaini
ng with fevers, treated with Tylenol, still remains fatigued, unable to get up to the restroom using 
bedside urinal currently.  He is tolerating some oral intake at this point in time.

 

PHYSICAL EXAMINATION:

VITAL SIGNS:  Temperature of 100.4, pulse of 90, respiratory rate of 14, oxygen saturation 95% on 2 l
iters nasal cannula, blood pressure 126/70.

GENERAL:  The patient is fatigued, arousable, oriented, no acute distress.

HEENT:  Normocephalic.  Nasal cannula in place.

NECK:  Supple.

HEART:  Regular rate and rhythm at time of exam.  No murmurs auscultated.

LUNGS:  Clear to auscultation bilaterally.  No rubs or wheezes.

ABDOMEN:  Protuberant, soft, positive bowel sounds throughout.

EXTREMITIES:  Right still with erythema and pallor; however, pallor is slightly reduced from prior er
ythema, edges are marked with a marking pen today, still with pitting edema to right lower extremity.


SKIN:  Abrasions and ulcerations with crusting without exudates or fluctuance.

NEUROLOGIC:  The patient is alert and oriented to time exam.  No focal deficits.  Speech is normal.

 

LABORATORY DATA:  White blood cell count of 26.4, hemoglobin of 11.0, platelet count of 173.  
ry:  Sodium 136, potassium of 3.6, chloride of 102, creatinine of 0.66, magnesium of 1.9.  CRP of 33.
  Urine culture negative at 36 hours.  Blood cultures x2 negative at 48 hours.

 

ASSESSMENT AND PLAN:  Sepsis secondary to spread of cellulitis from right lower extremity puncture, p
uncture wound _____.  The patient is tolerating vancomycin, Zosyn and doxycycline.  Continuing Tyleno
l for fever coverage.  Continuing IV fluids.  We will followup on a skin marking pen today.  Patient 
clinically looks better, although white blood cell count is not improved.  patient's electrolytes are
 better after lactating Ringer's bolus yesterday.  We will continue to follow with IV antibiotics unt
il resolved, leukocytosis and consider transition to orals and evaluation for patient's deconditionin
g with physical therapy once he breaks his fever and his white blood cell count, continuing patient's
 other home medications at this point in time.

## 2018-06-14 LAB
ANION GAP SERPL CALC-SCNC: 9 MMOL/L (ref 10–20)
BUN SERPL-MCNC: 9 MG/DL (ref 8.9–20.6)
CALCIUM SERPL-MCNC: 9 MG/DL (ref 7.8–10.44)
CHLORIDE SERPL-SCNC: 100 MMOL/L (ref 98–107)
CO2 SERPL-SCNC: 32 MMOL/L (ref 22–29)
CREAT CL PREDICTED SERPL C-G-VRATE: 204 ML/MIN (ref 70–130)
CRP SERPL-MCNC: 23.09 MG/DL
GLUCOSE SERPL-MCNC: 80 MG/DL (ref 70–105)
HGB BLD-MCNC: 10.5 G/DL (ref 14–18)
MACROCYTES BLD QL SMEAR: (no result) (100X)
MCH RBC QN AUTO: 32.6 PG (ref 27–31)
MCV RBC AUTO: 99.1 FL (ref 80–94)
MDIFF COMPLETE?: YES
PLATELET # BLD AUTO: 160 THOU/UL (ref 130–400)
PLATELET BLD QL SMEAR: (no result)
POTASSIUM SERPL-SCNC: 3.9 MMOL/L (ref 3.5–5.1)
RBC # BLD AUTO: 3.22 MILL/UL (ref 4.7–6.1)
SODIUM SERPL-SCNC: 137 MMOL/L (ref 136–145)
WBC # BLD AUTO: 21.4 THOU/UL (ref 4.8–10.8)

## 2018-06-14 RX ADMIN — ALUMINUM ZIRCONIUM TRICHLOROHYDREX GLY SCH: 0.2 STICK TOPICAL at 00:46

## 2018-06-14 RX ADMIN — ALUMINUM ZIRCONIUM TRICHLOROHYDREX GLY SCH EACH: 0.2 STICK TOPICAL at 08:50

## 2018-06-14 RX ADMIN — Medication SCH ML: at 10:35

## 2018-06-14 RX ADMIN — ALUMINUM ZIRCONIUM TRICHLOROHYDREX GLY SCH: 0.2 STICK TOPICAL at 02:45

## 2018-06-14 RX ADMIN — ALUMINUM ZIRCONIUM TRICHLOROHYDREX GLY SCH EACH: 0.2 STICK TOPICAL at 16:53

## 2018-06-14 RX ADMIN — Medication SCH: at 21:14

## 2018-06-14 NOTE — PRG
DATE OF SERVICE:  06/14/2018

 

HISTORY OF PRESENT ILLNESS:  The patient is now afebrile x24 hours, has started 
to have mild sputum production, tan in color.  The patient denies any shortness 
of breath, has been maintained on 2 liters nasal cannula.  Right lower 
extremity remains warm and erythematous.

 

LABORATORY DATA:  Vancomycin trough 9.9.  Sodium 137, potassium of 3.9, 
chloride of 100, creatinine 0.71, glucose of 80.  CRP 23.09, white blood cell 
count of 21.4, hemoglobin of 10.5, platelet count of 160.

 

OBJECTIVE:

VITAL SIGNS:  Temperature 98.3, pulse 70, respiratory rate of 16, oxygen 
saturation 96% on 2 liters nasal cannula, blood pressure 109/68.

GENERAL:  The patient is alert, oriented, no acute distress.

HEENT:  Normocephalic.  Nasal cannula in place, erythematous changes to the lips
, upper and lower.

NECK:  Supple.

HEART:  Regular rate and rhythm.  No murmurs auscultated.

LUNGS:  With coarse bronchial breath sounds lower lung fields clear to 
auscultation bilaterally.  No rubs or wheezes.

ABDOMEN:  Protuberant, some bloating noted, nontender, however.

EXTREMITIES:  Right lower extremity with continued pitting edema, erythema, and 
pallor.  Marking pen yesterday, slight satellite lesions extending outwards; 
however, some lessening of redness in certain locations; however, edema and 
pallor remain prominent.

NEUROLOGIC:  The patient is alert and oriented x3, no focal deficits.  Speech 
is normal.

 

ASSESSMENT AND PLAN:  Sepsis secondary to cellulitis barbed wire entry.  
Continuing IV antibiotics, IV fluids, following up on a daily CBC and CMP.  We 
will look to transition to oral antibiotics likely Sunday.  Consulting physical 
therapy for deconditioning secondary to sepsis for potential placement planning 
based on the patient's level of function.  If patient continues to do well, we 
will discontinue IV fluids tomorrow.  Continue patient's other home medications
, adding a stool softener at this point in time for mild constipation symptoms 
and bloating.  We will progress to a laxative tomorrow as needed. ASA 
prophylaxsis increased to 40mg lovenox. Compressive wrap to RLE with neuro/
vascular checks of dorsalis pedis pulses ordered and communicated to nursing 
staff yesterday prior to shift change. 

 

[facility]

## 2018-06-15 LAB
ANION GAP SERPL CALC-SCNC: 10 MMOL/L (ref 10–20)
BUN SERPL-MCNC: 8 MG/DL (ref 8.9–20.6)
CALCIUM SERPL-MCNC: 8.6 MG/DL (ref 7.8–10.44)
CHLORIDE SERPL-SCNC: 99 MMOL/L (ref 98–107)
CO2 SERPL-SCNC: 33 MMOL/L (ref 22–29)
CREAT CL PREDICTED SERPL C-G-VRATE: 204 ML/MIN (ref 70–130)
GLUCOSE SERPL-MCNC: 78 MG/DL (ref 70–105)
HGB BLD-MCNC: 10.3 G/DL (ref 14–18)
MCH RBC QN AUTO: 33.1 PG (ref 27–31)
MCV RBC AUTO: 99.6 FL (ref 80–94)
MDIFF COMPLETE?: YES
PLATELET # BLD AUTO: 188 THOU/UL (ref 130–400)
PLATELET BLD QL SMEAR: (no result)
POTASSIUM SERPL-SCNC: 3.9 MMOL/L (ref 3.5–5.1)
RBC # BLD AUTO: 3.12 MILL/UL (ref 4.7–6.1)
SODIUM SERPL-SCNC: 138 MMOL/L (ref 136–145)
VANCOMYCIN TROUGH SERPL-MCNC: 17.1 UG/ML
WBC # BLD AUTO: 20.3 THOU/UL (ref 4.8–10.8)

## 2018-06-15 RX ADMIN — Medication SCH: at 09:00

## 2018-06-15 RX ADMIN — ALUMINUM ZIRCONIUM TRICHLOROHYDREX GLY SCH EACH: 0.2 STICK TOPICAL at 16:24

## 2018-06-15 RX ADMIN — Medication SCH ML: at 19:36

## 2018-06-15 RX ADMIN — ASPIRIN SCH MG: 81 TABLET ORAL at 08:29

## 2018-06-15 RX ADMIN — ALUMINUM ZIRCONIUM TRICHLOROHYDREX GLY SCH EACH: 0.2 STICK TOPICAL at 08:30

## 2018-06-15 NOTE — PRG
DATE OF SERVICE:  06/15/2018

 

HISTORY OF PRESENT ILLNESS:  The patient continues to cough with some sputum production, tan in consi
stency, requires oxygen therapy.  Physical therapy recommending inpatient rehabilitation given his de
conditioning, continued need for oxygen and IV antibiotics.  The patient's lower extremity was mainta
ined in Ace bandage overnight without issue and good vascular checks.  The patient has no additional 
complaints.  He took a stool softener this morning without any bowel movement being produced, still p
assing flatus.

 

OBJECTIVE:

VITAL SIGNS:  Temperature this morning of 98.6, pulse of 74, respiratory rate of 20, oxygen saturatio
n 96% on 2 liters nasal cannula, blood pressure 124/80.

GENERAL:  The patient is arousable, in no acute distress, sitting comfortably on nasal cannula.

HEENT:  Head is normocephalic.  Oral mucosa is moist.  No spreading of erythema on the lips, followin
g Vaseline and oxygen yesterday.

NECK:  Supple.

HEART:  Regular rate and rhythm.  No murmurs auscultated.

LUNGS:  Clear to auscultation bilaterally, diminished in bases secondary to body habitus.

ABDOMEN:  Protuberant, somewhat bloated, nontender, positive bowel sounds.

LOWER EXTREMITIES:  Wrap intact to right lower extremity.  Satellite lesions have not increased past 
the marking line, pallor remains, erythema somewhat better, induration somewhat better following comp
ressive wrap.

 

LABORATORY DATA:  White blood cell count of 20.3, hemoglobin of 10.3, platelet count of 188.  Sodium 
of 138, potassium of 3.9, chloride of 99, BUN of 8, creatinine 0.71, glucose is 78.  Vancomycin troug
h of 17.1.  Chest x-ray shows a  shunt emptying into chest cavity as prior, has increased right ple
ural effusion secondarily, cannot exclude a right lower lobe infiltrates.

 

ASSESSMENT AND PLAN:  Sepsis secondary to cellulitis, appears to have spread to his lungs.  Pneumonia
, likely present on admission given his reliance on oxygen rather than hospital acquired.  The patien
t is on broad-spectrum antibiotics, vancomycin and Zosyn.  We will continue to trend chest x-ray whil
e he is requiring oxygen therapy.  We started a dose of IV Lasix and stopped IV fluids to see if we c
an dry him out some regarding his right pleural effusion.  The patient's electrolyte disturbances hav
e been resolved.  He still has patent  shunt from prior traumatic brain injury.  He remains on his 
medications for his depression with psychosis.  He has not been combative with nursing.  He has been 
oriented on exam on rounds.  The patient's deconditioning with physical therapy evaluation.  The morgan
ent is unable to stand or walk with wound and sepsis.  Recommending inpatient rehabilitation.  Case zita boland has been consulted for possible discharge planning with likely continuation of physical therap
y and IV antibiotics.

## 2018-06-15 NOTE — RAD
PORTABLE CHEST:

 

HISTORY: 

Cough.

 

FINDINGS: 

Comparison 6/11/18.

 

There continues to be blunting of the right CP angle and stranding in the right lung base.  The stran
ding and atelectasis in the right lung base is slightly more prominent today.  The upper lung fields 
remain clear.  Heart is mildly prominent in size but stable.  

 

IMPRESSION: 

1,  Right-sided effusion with right basilar atelectasis and stranding.  I cannot exclude an associate
d basilar infiltrate.  

 

2.  The right-sided ventriculoperitoneal shunt catheter is unchanged in appearance.

 

POS: [facility]

## 2018-06-16 VITALS — TEMPERATURE: 98 F | SYSTOLIC BLOOD PRESSURE: 99 MMHG | DIASTOLIC BLOOD PRESSURE: 65 MMHG

## 2018-06-16 LAB
ANION GAP SERPL CALC-SCNC: 11 MMOL/L (ref 10–20)
BUN SERPL-MCNC: 7 MG/DL (ref 8.9–20.6)
CALCIUM SERPL-MCNC: 8.8 MG/DL (ref 7.8–10.44)
CHLORIDE SERPL-SCNC: 96 MMOL/L (ref 98–107)
CO2 SERPL-SCNC: 37 MMOL/L (ref 22–29)
CREAT CL PREDICTED SERPL C-G-VRATE: 204 ML/MIN (ref 70–130)
GLUCOSE SERPL-MCNC: 80 MG/DL (ref 70–105)
HGB BLD-MCNC: 11 G/DL (ref 14–18)
MACROCYTES BLD QL SMEAR: (no result) (100X)
MCH RBC QN AUTO: 33.4 PG (ref 27–31)
MCV RBC AUTO: 99.6 FL (ref 80–94)
MDIFF COMPLETE?: YES
PLATELET # BLD AUTO: 244 THOU/UL (ref 130–400)
PLATELET BLD QL SMEAR: (no result)
POTASSIUM SERPL-SCNC: 4.1 MMOL/L (ref 3.5–5.1)
RBC # BLD AUTO: 3.28 MILL/UL (ref 4.7–6.1)
SODIUM SERPL-SCNC: 140 MMOL/L (ref 136–145)
VANCOMYCIN TROUGH SERPL-MCNC: 11.4 UG/ML
WBC # BLD AUTO: 21.1 THOU/UL (ref 4.8–10.8)

## 2018-06-16 RX ADMIN — ASPIRIN SCH MG: 81 TABLET ORAL at 08:25

## 2018-06-16 RX ADMIN — ALUMINUM ZIRCONIUM TRICHLOROHYDREX GLY SCH EACH: 0.2 STICK TOPICAL at 08:28

## 2018-06-16 RX ADMIN — Medication SCH ML: at 08:25

## 2018-06-18 NOTE — DIS
DATE OF ADMISSION:  06/11/2018

 

DATE OF DISCHARGE:  06/16/2018

 

PRIMARY CARE PHYSICIAN:  Norris Bynum M.D. 

 

CHIEF COMPLAINT:  Lower extremity cellulitis.

 

PRESENTING HISTORY OF PRESENT ILLNESS:  The patient presented to the emergency department from his [place], his sister, whom he lives with.  He has been dependent on her with some level of mental gal
rdation following a traumatic brain injury some years ago.  A  shunt is in place into his right tho
racic cavity.  He has gotten scratched by barbed wire and/or brush approximately a week prior with wo
rsening lower extremity edema, redness and pallor.  He finally became delirious and febrile.  She pre
sented to the Emergency Department with the patient who was found to be in acute sepsis with hyponatr
emia, hypokalemia.  Following volume rehydration, vancomycin and Zosyn developing pleural effusion an
d possible pneumonia which was likely present on admission, electrolytes were corrected.  The patient
 was added on doxycycline.  Blood pressure, heart rate returned to normal.  However, the patient's le
ukocytosis did not improve as quickly as we would like.  We added Lasix to transition to help treat p
leural effusion complicated by  shunt.  On discharge modulate antibiotics, Rocephin and Levaquin fo
r a perhaps better coverage of cellulitis and pneumonia.  The patient's lower extremity was placed in
 compressive wrap with vascular checks to ensure circulation was maintained.  The patient with a hist
ory of venous stasis dermatitis.  The patient was maintained on his depression medications secondary 
to his acute psychosis.  The patient required oxygen therapy throughout his stay and was accepted for
 inpatient rehab with [facility] and was transferred for continued physical therapy, occupat
ional therapy, nursing and IV antibiotics and at this point oxygen therapy.

 

DISCHARGE DIET:  Regular.

 

DISCHARGE ACTIVITY:  Per physical therapy with assistance.  Oxygen therapy.  

 

FOLLOWUP:  Follow up with myself following discharge from inpatient rehab in one week.

 

CONSULTATIONS:  No consultations were performed. 

 

DISCHARGE MEDICATIONS:  Included 81 mg aspirin, Rocephin 2 grams daily, Depakote 1500 mg b.i.d., Cola
ce 100 mg daily, fenofibrate 145 mg daily, Lasix 40 mg IV daily.  Vascepa 1 gram b.i.d., Levaquin 750
 mg IV daily, Synthroid 125 mcg, olanzapine 25 mg at bedtime, prednisone 40 mg daily, Crestor 10 mg d
chanel, Zoloft 100 mg b.i.d.

## 2018-06-19 ENCOUNTER — HOSPITAL ENCOUNTER (OUTPATIENT)
Dept: HOSPITAL 92 - SPEC | Age: 43
End: 2018-06-19
Attending: INTERNAL MEDICINE
Payer: MEDICARE

## 2018-06-19 DIAGNOSIS — A41.9: Primary | ICD-10-CM

## 2018-06-19 PROCEDURE — B548ZZA ULTRASONOGRAPHY OF SUPERIOR VENA CAVA, GUIDANCE: ICD-10-PCS | Performed by: INTERNAL MEDICINE

## 2018-06-19 PROCEDURE — C1751 CATH, INF, PER/CENT/MIDLINE: HCPCS

## 2018-06-19 PROCEDURE — 02HV33Z INSERTION OF INFUSION DEVICE INTO SUPERIOR VENA CAVA, PERCUTANEOUS APPROACH: ICD-10-PCS | Performed by: INTERNAL MEDICINE

## 2018-06-19 PROCEDURE — B518YZA FLUOROSCOPY OF SUPERIOR VENA CAVA USING OTHER CONTRAST, GUIDANCE: ICD-10-PCS | Performed by: INTERNAL MEDICINE

## 2018-06-19 PROCEDURE — 36569 INSJ PICC 5 YR+ W/O IMAGING: CPT

## 2018-06-19 NOTE — SPC
ULTRASOUND LEFT UPPER EXTREMITY PICC PLACEMENT:

 

INDICATION: 

Sepsis.

 

TECHNIQUE: 

Informed consent was obtained.  Preprocedure ultrasound was performed of the left upper extremity.  T
here is a patent left basilic vein.  A site overlying the left upper extremity was prepped and draped
 in the usual sterile fashion.  Buffered 1% Lidocaine was administered to the overlying subcutaneous 
tissues.  Under ultrasound guidance, a micropuncture access kit was utilized to gain access to the le
ft basilic vein.  The guidewire was advanced to the level of the IVC.  A 5 Luxembourg[place] catheter sheath was
 then placed.  A dual-lumen PICC line trimmed to 49 cm guided over the wire and through the sheath.  
The sheath and wire were removed.  The catheter was advanced to the level of the cavoatrial junction.
  The catheter flushed and aspirated appropriately.  The patient tolerated the procedure without diff
iculty.  Total fluoroscopic time was 0.3 minutes.  Total exposure was 2,974 mGy*^cm2.

 

IMPRESSION: 

Successful left upper extremity PICC line.

 

POS: [facility]

## 2019-08-15 ENCOUNTER — HOSPITAL ENCOUNTER (INPATIENT)
Dept: HOSPITAL 92 - ERS | Age: 44
LOS: 4 days | Discharge: HOME | DRG: 872 | End: 2019-08-19
Attending: FAMILY MEDICINE | Admitting: FAMILY MEDICINE
Payer: MEDICARE

## 2019-08-15 VITALS — BODY MASS INDEX: 29.3 KG/M2

## 2019-08-15 DIAGNOSIS — F32.9: ICD-10-CM

## 2019-08-15 DIAGNOSIS — A41.9: Primary | ICD-10-CM

## 2019-08-15 DIAGNOSIS — E03.9: ICD-10-CM

## 2019-08-15 DIAGNOSIS — Z91.81: ICD-10-CM

## 2019-08-15 DIAGNOSIS — Z87.820: ICD-10-CM

## 2019-08-15 DIAGNOSIS — Z98.2: ICD-10-CM

## 2019-08-15 DIAGNOSIS — F41.9: ICD-10-CM

## 2019-08-15 DIAGNOSIS — E78.00: ICD-10-CM

## 2019-08-15 DIAGNOSIS — Z79.899: ICD-10-CM

## 2019-08-15 LAB
ALBUMIN SERPL BCG-MCNC: 3.7 G/DL (ref 3.5–5)
ALP SERPL-CCNC: 40 U/L (ref 40–150)
ALT SERPL W P-5'-P-CCNC: 23 U/L (ref 8–55)
ANION GAP SERPL CALC-SCNC: 9 MMOL/L (ref 10–20)
AST SERPL-CCNC: 30 U/L (ref 5–34)
BILIRUB SERPL-MCNC: 0.3 MG/DL (ref 0.2–1.2)
BUN SERPL-MCNC: 14 MG/DL (ref 8.9–20.6)
CALCIUM SERPL-MCNC: 8.7 MG/DL (ref 7.8–10.44)
CHLORIDE SERPL-SCNC: 101 MMOL/L (ref 98–107)
CO2 SERPL-SCNC: 25 MMOL/L (ref 22–29)
CREAT CL PREDICTED SERPL C-G-VRATE: 0 ML/MIN (ref 70–130)
GLOBULIN SER CALC-MCNC: 2.2 G/DL (ref 2.4–3.5)
GLUCOSE SERPL-MCNC: 119 MG/DL (ref 70–105)
HGB BLD-MCNC: 12.8 G/DL (ref 14–18)
MCH RBC QN AUTO: 32.7 PG (ref 27–31)
MCV RBC AUTO: 95.8 FL (ref 78–98)
MDIFF COMPLETE?: YES
PLATELET # BLD AUTO: 199 THOU/UL (ref 130–400)
POLYCHROMASIA BLD QL SMEAR: (no result) (100X)
POTASSIUM SERPL-SCNC: 3.4 MMOL/L (ref 3.5–5.1)
PROT UR STRIP.AUTO-MCNC: 30 MG/DL
RBC # BLD AUTO: 3.91 MILL/UL (ref 4.7–6.1)
RBC UR QL AUTO: (no result) HPF (ref 0–3)
SODIUM SERPL-SCNC: 132 MMOL/L (ref 136–145)
STOMATOCYTES BLD QL SMEAR: (no result) (100X)
WBC # BLD AUTO: 11 THOU/UL (ref 4.8–10.8)
WBC UR QL AUTO: (no result) HPF (ref 0–3)

## 2019-08-15 PROCEDURE — 84484 ASSAY OF TROPONIN QUANT: CPT

## 2019-08-15 PROCEDURE — 83605 ASSAY OF LACTIC ACID: CPT

## 2019-08-15 PROCEDURE — 71045 X-RAY EXAM CHEST 1 VIEW: CPT

## 2019-08-15 PROCEDURE — 80053 COMPREHEN METABOLIC PANEL: CPT

## 2019-08-15 PROCEDURE — 93005 ELECTROCARDIOGRAM TRACING: CPT

## 2019-08-15 PROCEDURE — 85025 COMPLETE CBC W/AUTO DIFF WBC: CPT

## 2019-08-15 PROCEDURE — 86140 C-REACTIVE PROTEIN: CPT

## 2019-08-15 PROCEDURE — 81015 MICROSCOPIC EXAM OF URINE: CPT

## 2019-08-15 PROCEDURE — 36415 COLL VENOUS BLD VENIPUNCTURE: CPT

## 2019-08-15 PROCEDURE — 87040 BLOOD CULTURE FOR BACTERIA: CPT

## 2019-08-15 PROCEDURE — 70470 CT HEAD/BRAIN W/O & W/DYE: CPT

## 2019-08-15 PROCEDURE — 87086 URINE CULTURE/COLONY COUNT: CPT

## 2019-08-15 PROCEDURE — 80202 ASSAY OF VANCOMYCIN: CPT

## 2019-08-15 PROCEDURE — 81003 URINALYSIS AUTO W/O SCOPE: CPT

## 2019-08-15 PROCEDURE — 80048 BASIC METABOLIC PNL TOTAL CA: CPT

## 2019-08-15 NOTE — RAD
XR Chest 1 View Portable



History: Altered mental status. Dizziness



Comparison: Radiograph Ambreen 15, 2018



Findings: Shunt catheter projects over the right hemithorax. Chronic right effusion with round atelec
tasis. Left lung is clear. No pneumothorax.



Impression: Similar examination of the chest.



Reported By: Guillermo Serna 

Electronically Signed:  8/15/2019 3:26 PM

## 2019-08-16 LAB
ANION GAP SERPL CALC-SCNC: 9 MMOL/L (ref 10–20)
BUN SERPL-MCNC: 12 MG/DL (ref 8.9–20.6)
CALCIUM SERPL-MCNC: 8.7 MG/DL (ref 7.8–10.44)
CHLORIDE SERPL-SCNC: 107 MMOL/L (ref 98–107)
CO2 SERPL-SCNC: 25 MMOL/L (ref 22–29)
CREAT CL PREDICTED SERPL C-G-VRATE: 156 ML/MIN (ref 70–130)
GLUCOSE SERPL-MCNC: 87 MG/DL (ref 70–105)
HGB BLD-MCNC: 11.8 G/DL (ref 14–18)
MCH RBC QN AUTO: 33.2 PG (ref 27–31)
MCV RBC AUTO: 97.6 FL (ref 78–98)
MDIFF COMPLETE?: YES
PLATELET # BLD AUTO: 186 THOU/UL (ref 130–400)
POTASSIUM SERPL-SCNC: 3.8 MMOL/L (ref 3.5–5.1)
RBC # BLD AUTO: 3.55 MILL/UL (ref 4.7–6.1)
SODIUM SERPL-SCNC: 137 MMOL/L (ref 136–145)
VANCOMYCIN TROUGH SERPL-MCNC: 18.3 UG/ML
WBC # BLD AUTO: 15.5 THOU/UL (ref 4.8–10.8)

## 2019-08-16 RX ADMIN — VANCOMYCIN HYDROCHLORIDE SCH MLS: 1 INJECTION, POWDER, LYOPHILIZED, FOR SOLUTION INTRAVENOUS at 17:51

## 2019-08-16 RX ADMIN — CEFTRIAXONE SCH MLS: 2 INJECTION, POWDER, FOR SOLUTION INTRAMUSCULAR; INTRAVENOUS at 15:09

## 2019-08-16 RX ADMIN — DIVALPROEX SODIUM SCH MG: 500 TABLET, DELAYED RELEASE ORAL at 20:35

## 2019-08-16 RX ADMIN — VANCOMYCIN HYDROCHLORIDE SCH MLS: 1 INJECTION, POWDER, LYOPHILIZED, FOR SOLUTION INTRAVENOUS at 10:09

## 2019-08-16 RX ADMIN — VANCOMYCIN HYDROCHLORIDE SCH MLS: 1 INJECTION, POWDER, LYOPHILIZED, FOR SOLUTION INTRAVENOUS at 02:56

## 2019-08-16 RX ADMIN — DIVALPROEX SODIUM SCH MG: 500 TABLET, DELAYED RELEASE ORAL at 10:13

## 2019-08-16 NOTE — CT
CT BRAIN WITH AND WITHOUT IV CONTRAST:



HISTORY:  shunt, dizziness, unsteady gait, sepsis



COMPARISON: 3/30/2018



FINDINGS:



Encephalomalacia and gliosis in the anterior inferior frontal lobes bilaterally representing sequelae
 of prior trauma are again seen. The moderately large region of right anterior temporal lobe

encephalomalacia and gliosis and changes of right frontotemporal craniotomy and left parietal craniot
maria r are again seen.



The  shunt catheter entering through a right parietal mary hole, traversing the midline posterior t
o the splenium of the corpus callosum, traversing the posterior body of the left lateral ventricle

with distal tip in the region of the posterior limb of the left internal capsule is unchanged.



No significant ventriculomegaly is seen. No evidence of acute infarct, hemorrhage, midline shift or a
bnormal extra-axial fluid collection is seen. No mass or abnormal postcontrast enhancement is

identified.



IMPRESSION: No CT evidence of acute intracranial process.



Reported By: John Cole 

Electronically Signed:  8/16/2019 3:13 PM

## 2019-08-16 NOTE — PRG
DATE OF SERVICE:  08/16/2019



HISTORY OF PRESENT ILLNESS:  The patient states he feels better, but continues to be

a bit on the cold side, temperatures have trended rather than febrile down to below

typical cut off the 96 range.  The patient is tolerating IV antibiotics well.

Otherwise, denies any urinary symptoms.  Denies any sinus symptoms.  Denies any

cough or congestion.  Prior history of lower extremity cellulitis.  However, his

legs currently with controlled edema and no erythema or pallor. 



OBJECTIVE:  VITAL SIGNS:  Temperature of 96.6, pulse of 72, respiratory rate of 18,

oxygen saturation 95% on room air, blood pressure 110/74. 



LABORATORY DATA:  Sodium 137, potassium is 3.8, creatinine of 0.84.  White blood

cell count of 15, neutrophil percent is 35, low; hemoglobin is 11.8.  Brain CT

without acute events.   shunt appears to be stable.  Urine culture at 12 hours, no

growth.  Blood cultures at 24 hours, no growth x2. 



PHYSICAL EXAMINATION:

GENERAL:  The patient is alert, quick to return to sleep. 

HEENT:  Head is normocephalic.  Extraocular movements are intact.  Sclerae white.

Oral mucosa is moist. 

NECK:  Supple. 

HEART:  Regular rate and rhythm at the time of exam.  No murmurs auscultated. 

LUNGS:  Clear to auscultation bilaterally.  No rubs or wheezes. 

ABDOMEN:  Soft, nontender.  Positive bowel sounds throughout. 

EXTREMITIES:  Lower extremities with signs and symptoms of venous stasis dermatitis.

 Lack of hair on shins bilaterally.  Hemosiderin deposits present bilaterally.  No

pitting edema, however, no cyanosis of lower extremities on feet bilaterally.  The

patient is alert and oriented x3.  No focal deficits.  Speech is normal. 



ASSESSMENT AND PLAN:  Sepsis of unknown source.  Given the patient's  shunt,

changing Zosyn for Rocephin at this point in time.  We will trend cell counts and

follow hyperthermia and hypothermia, p.r.n. warmer.  If the patient does not improve

over the weekend, may consider lumbar puncture.  At this point in time, the patient

remains on IV 

fluids to keep IV patent and macario off acute kidney injury from antibiotic use and to

resuscitate any hydration needs from sepsis.  The patient has a prior remote

traumatic brain injury with mood disorder, continued on Zyprexa and Zoloft as well

as Depakote, prophylactic Lovenox at 40 mg subcu daily. 







Job ID:  [id number]

## 2019-08-16 NOTE — HP
Admitting physician will be Dr. Norris Byunm; Dr. Michael Garcia, roque.



HISTORY OF PRESENT ILLNESS:  The patient is a 43-year-old male, who has a previous

history of traumatic brain injury with a functioning  shunt.  He states he was in

his normal state of health when he went to a local [place].  He began walking

around, felt very dizzy.  He got lightheaded and fell to the floor; 911 was called,

and he was brought to the hospital.  He was seen and evaluated in the ER, apparently

found to have a fever of 102.4.  He was seen and evaluated in the ER.  His ER workup

proved to show no evidence of any current infection.  His chest x-ray was clear. 



The patient was examined and questioned by me.  He states he was feeling well, had

no difficulties today or yesterday.  He has not noted any recent injuries or trauma.

 No vomiting or diarrhea.  No productive coughing.  He states he simply went to

[employer] and became ill there.  He was brought to the emergency room.  He apparently

has a previous history of cellulitis of his right lower extremity.  He has had no

known injuries.  His shunt seems to be functioning properly.  As noted, he has a

previous history of traumatic brain injury and he has had multiple brain surgeries. 



He is accompanied today by his sister.  Overall, he states, right now he is feeling

quite well.  He reports no other medical complaints. 



He has not noted any headache, particular body ache, or chills.  Otherwise, he did

not realize he had fever. 



ALLERGIES:  HE HAS NO KNOWN ALLERGIES.



MEDICATIONS:  Medications were reviewed over with his sister's phone.  They were not

available to me at this time, but they were reviewed. 



PAST MEDICAL HISTORY:  Previous history of traumatic brain injury from fall in a

manhole.  He has a  shunt.  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

his brain injury.  He reports no other surgeries.  Medical history is positive for

hypothyroidism as well as hypercholesterolemia. 



FAMILY HISTORY:  Positive for diabetes.



SOCIAL AND PERSONAL HISTORY:  He lives with his sister, who has medical power of

.  He also has a sitter at times. 



REVIEW OF SYSTEMS:  GI:  Negative. 

:  Negative. 

CARDIOVASCULAR:  Otherwise, negative RESPIRATORY:  Otherwise, negative.



PHYSICAL EXAMINATION:

VITAL SIGNS:  His blood pressure is 114/74, pulse 100, respirations 20, O2

saturations 98% on room air.  Temperature is 99.3. 

GENERAL:  He is alert, active, does not appear in any distress.  He answers

questions appropriately. 

HEENT:  There is a  shunt that is able to be palpated.  There is no evidence of

any abrasions or lesions otherwise noted. 

NECK:  Supple.  Full range of motion.  No masses. 

LUNGS:  Clear bilaterally. 

HEART:  Reveals a regular rate and rhythm with a slight tachycardia at times without

murmurs, gallops, or rubs. 

ABDOMEN:  Soft and nontender.  The bowel sounds are present and active.  No

hepatosplenomegaly is noted.  There is no evidence of rebound or guarding noted at

this time. 

EXTREMITIES:  Showed no evidence of any clubbing, edema, or cyanosis noted at this

time. 

SKIN:  Shows no evidence of any skin breakdown or skin lesions. 

NEUROLOGIC:  He follows commands well.  He moves all extremities upon command.



LABORATORY DATA:  His white blood count is 11.0, hemoglobin 12.8, and hematocrit is

37.5.  He has 53 segs, 20 bands, 12 lymphocytes, 1 reactive lymphocyte, 14

monocytes.  Sodium 132, potassium 3.4, chloride 101, CO2 of 25, BUN 14, and

creatinine 0.87.  His lactic acid is normal at 2.2.  Urinalysis is clear. 



IMAGING DATA:  Chest x-ray shows catheter in the right hemithorax with a chronic

right effusion with some atelectasis, but otherwise is clear.  No change from

previous chest x-ray. 



IMPRESSION:  This is a 43-year-old male, who has a history of previous brain injury,

has a  shunt, who presented to the emergency room with weakness and fever.  All

workup show no evidence of any active infection thus far.  Thus, he will be admitted

with a diagnosis of possible sepsis.  He will be placed on IV antibiotic, which he

has already had some.  I believe that he had Zosyn and vancomycin, which we will

continue at this time.  I have discussed the findings with the patient as well as

his sister. 



PLAN:  Admit.  At this time, continue IV antibiotics.  Continue IV fluids.  Dr. Bynum will resume care in a.m.. 







Job ID:  [id number]

## 2019-08-17 LAB
ALBUMIN SERPL BCG-MCNC: 3.1 G/DL (ref 3.5–5)
ALP SERPL-CCNC: 40 U/L (ref 40–150)
ALT SERPL W P-5'-P-CCNC: 16 U/L (ref 8–55)
ANION GAP SERPL CALC-SCNC: 10 MMOL/L (ref 10–20)
AST SERPL-CCNC: 17 U/L (ref 5–34)
BASOPHILS # BLD AUTO: 0.1 THOU/UL (ref 0–0.2)
BASOPHILS NFR BLD AUTO: 0.4 % (ref 0–1)
BILIRUB SERPL-MCNC: 0.2 MG/DL (ref 0.2–1.2)
BUN SERPL-MCNC: 8 MG/DL (ref 8.9–20.6)
CALCIUM SERPL-MCNC: 8.7 MG/DL (ref 7.8–10.44)
CHLORIDE SERPL-SCNC: 112 MMOL/L (ref 98–107)
CO2 SERPL-SCNC: 25 MMOL/L (ref 22–29)
CREAT CL PREDICTED SERPL C-G-VRATE: 172 ML/MIN (ref 70–130)
EOSINOPHIL # BLD AUTO: 0.2 THOU/UL (ref 0–0.7)
EOSINOPHIL NFR BLD AUTO: 1.4 % (ref 0–10)
GLOBULIN SER CALC-MCNC: 2.2 G/DL (ref 2.4–3.5)
GLUCOSE SERPL-MCNC: 73 MG/DL (ref 70–105)
HGB BLD-MCNC: 11.6 G/DL (ref 14–18)
LYMPHOCYTES # BLD: 2.9 THOU/UL (ref 1.2–3.4)
LYMPHOCYTES NFR BLD AUTO: 22.8 % (ref 21–51)
MCH RBC QN AUTO: 31.8 PG (ref 27–31)
MCV RBC AUTO: 97.5 FL (ref 78–98)
MONOCYTES # BLD AUTO: 1.8 THOU/UL (ref 0.11–0.59)
MONOCYTES NFR BLD AUTO: 14.3 % (ref 0–10)
NEUTROPHILS # BLD AUTO: 7.8 THOU/UL (ref 1.4–6.5)
NEUTROPHILS NFR BLD AUTO: 61 % (ref 42–75)
PLATELET # BLD AUTO: 193 THOU/UL (ref 130–400)
POTASSIUM SERPL-SCNC: 3.8 MMOL/L (ref 3.5–5.1)
RBC # BLD AUTO: 3.64 MILL/UL (ref 4.7–6.1)
SODIUM SERPL-SCNC: 143 MMOL/L (ref 136–145)
WBC # BLD AUTO: 12.8 THOU/UL (ref 4.8–10.8)

## 2019-08-17 RX ADMIN — VANCOMYCIN HYDROCHLORIDE SCH MLS: 1 INJECTION, POWDER, LYOPHILIZED, FOR SOLUTION INTRAVENOUS at 01:59

## 2019-08-17 RX ADMIN — DIVALPROEX SODIUM SCH MG: 500 TABLET, DELAYED RELEASE ORAL at 20:35

## 2019-08-17 RX ADMIN — VANCOMYCIN HYDROCHLORIDE SCH MLS: 1 INJECTION, POWDER, LYOPHILIZED, FOR SOLUTION INTRAVENOUS at 17:23

## 2019-08-17 RX ADMIN — DIVALPROEX SODIUM SCH MG: 500 TABLET, DELAYED RELEASE ORAL at 08:48

## 2019-08-17 RX ADMIN — CEFTRIAXONE SCH MLS: 2 INJECTION, POWDER, FOR SOLUTION INTRAMUSCULAR; INTRAVENOUS at 14:10

## 2019-08-17 RX ADMIN — VANCOMYCIN HYDROCHLORIDE SCH MLS: 1 INJECTION, POWDER, LYOPHILIZED, FOR SOLUTION INTRAVENOUS at 10:32

## 2019-08-17 NOTE — PRG
DATE OF SERVICE:  08/17/2019



The patient is in bed 4414. 



The patient's temp has been afebrile over the last 24 hours.  Feels better today.

His I's and O's shows an intake of 3200, output of 750.  This over the last 24 hours

showed a balance positive at 2450. 



OBJECTIVE:  LUNGS:  Clear. 

HEART:  S1, S2.  No rubs, murmurs, or gallops. 

ABDOMEN:  Obese, soft, nontender. 



His white count has improved down to 12.8 with a hemoglobin stable at 11.5 and 35.5,

platelet count stable at 193.  His chemistry shows sodium 143, potassium 3.8,

chloride 112, bicarb 25, BUN is 8, creatinine of 0.76, GFR is greater than 90.  His

microbiology shows no growth at this time. 



PLAN:  We will keep on for at least another day.  We will keep following labs.



ASSESSMENT:  Fever of unknown origin.  Wound sepsis of unknown source.  Will stay on

Rocephin at this time as the patient has been evaluated and not been found to be in

any distress or any problems.  His renal function is within normal limits.  He has

had no seizures.  Hopefully home soon. 







Job ID:  [id number]

## 2019-08-18 LAB
ANION GAP SERPL CALC-SCNC: 13 MMOL/L (ref 10–20)
BASOPHILS # BLD AUTO: 0.1 THOU/UL (ref 0–0.2)
BASOPHILS NFR BLD AUTO: 0.6 % (ref 0–1)
BUN SERPL-MCNC: 7 MG/DL (ref 8.9–20.6)
CALCIUM SERPL-MCNC: 9.1 MG/DL (ref 7.8–10.44)
CHLORIDE SERPL-SCNC: 109 MMOL/L (ref 98–107)
CO2 SERPL-SCNC: 24 MMOL/L (ref 22–29)
CREAT CL PREDICTED SERPL C-G-VRATE: 192 ML/MIN (ref 70–130)
CRP SERPL-MCNC: 3.39 MG/DL
EOSINOPHIL # BLD AUTO: 0.3 THOU/UL (ref 0–0.7)
EOSINOPHIL NFR BLD AUTO: 2.9 % (ref 0–10)
GLUCOSE SERPL-MCNC: 70 MG/DL (ref 70–105)
HGB BLD-MCNC: 12.2 G/DL (ref 14–18)
LYMPHOCYTES # BLD: 4.2 THOU/UL (ref 1.2–3.4)
LYMPHOCYTES NFR BLD AUTO: 35.4 % (ref 21–51)
MCH RBC QN AUTO: 33.3 PG (ref 27–31)
MCV RBC AUTO: 97.8 FL (ref 78–98)
MONOCYTES # BLD AUTO: 1.1 THOU/UL (ref 0.11–0.59)
MONOCYTES NFR BLD AUTO: 8.8 % (ref 0–10)
NEUTROPHILS # BLD AUTO: 6.2 THOU/UL (ref 1.4–6.5)
NEUTROPHILS NFR BLD AUTO: 52.2 % (ref 42–75)
PLATELET # BLD AUTO: 189 THOU/UL (ref 130–400)
POTASSIUM SERPL-SCNC: 3.8 MMOL/L (ref 3.5–5.1)
RBC # BLD AUTO: 3.66 MILL/UL (ref 4.7–6.1)
SODIUM SERPL-SCNC: 142 MMOL/L (ref 136–145)
WBC # BLD AUTO: 11.9 THOU/UL (ref 4.8–10.8)

## 2019-08-18 RX ADMIN — DIVALPROEX SODIUM SCH MG: 500 TABLET, DELAYED RELEASE ORAL at 07:52

## 2019-08-18 RX ADMIN — VANCOMYCIN HYDROCHLORIDE SCH MLS: 1 INJECTION, POWDER, LYOPHILIZED, FOR SOLUTION INTRAVENOUS at 02:28

## 2019-08-18 RX ADMIN — VANCOMYCIN HYDROCHLORIDE SCH MLS: 1 INJECTION, POWDER, LYOPHILIZED, FOR SOLUTION INTRAVENOUS at 09:22

## 2019-08-18 RX ADMIN — DIVALPROEX SODIUM SCH MG: 500 TABLET, DELAYED RELEASE ORAL at 20:03

## 2019-08-18 NOTE — PRG
DATE OF SERVICE:  08/18/2019



SUBJECTIVE:  He has had an uneventful evening.  Feels good today.  He has no

diarrhea.  Has been afebrile overnight, tolerating p.o.  All his cultures remain

negative. 



OBJECTIVE:  HEART:  S1 and S2.  No rubs, murmurs, or gallops. 

LUNGS:  Clear to auscultation. 

ABDOMEN:  Soft, nontender, slightly obese.  Bowel sounds are normoactive. 

EXTREMITIES:  Good palpable pulses in all 4 extremities.  No cyanosis, clubbing, or

edema. 



LABORATORY DATA:  White count is down to 11.9, hemoglobin is up to 12.2.  His

neutrophil percentage is at 52 lymphocytes of 35, no bands are being reported.

Chemistry, sodium 142, potassium 3.8, chloride 109, bicarb 24, BUN 7, creatinine

0.68, GFR is greater than 90, glucose is 70.  C-reactive protein is 3.39.  All

cultures are still showing no growth. 



ASSESSMENT:  Sepsis of unknown origin, appears to be controlled.  Based on the

current situation, I will be changing from IV antibiotics to p.o.  I will stop the

vancomycin and the Rocephin and start him on Omnicef, and if everything is doing

well, hopefully Dr. Bynum let him go home tomorrow. 







Job ID:  [id number]

## 2019-08-19 VITALS — TEMPERATURE: 97.6 F | SYSTOLIC BLOOD PRESSURE: 118 MMHG | DIASTOLIC BLOOD PRESSURE: 76 MMHG

## 2019-08-19 LAB
BASOPHILS # BLD AUTO: 0.1 THOU/UL (ref 0–0.2)
BASOPHILS NFR BLD AUTO: 0.5 % (ref 0–1)
EOSINOPHIL # BLD AUTO: 0.4 THOU/UL (ref 0–0.7)
EOSINOPHIL NFR BLD AUTO: 3.7 % (ref 0–10)
HGB BLD-MCNC: 13 G/DL (ref 14–18)
LYMPHOCYTES # BLD: 4.6 THOU/UL (ref 1.2–3.4)
LYMPHOCYTES NFR BLD AUTO: 44.4 % (ref 21–51)
MCH RBC QN AUTO: 32.6 PG (ref 27–31)
MCV RBC AUTO: 98.1 FL (ref 78–98)
MONOCYTES # BLD AUTO: 0.9 THOU/UL (ref 0.11–0.59)
MONOCYTES NFR BLD AUTO: 8.6 % (ref 0–10)
NEUTROPHILS # BLD AUTO: 4.4 THOU/UL (ref 1.4–6.5)
NEUTROPHILS NFR BLD AUTO: 42.7 % (ref 42–75)
PLATELET # BLD AUTO: 216 THOU/UL (ref 130–400)
RBC # BLD AUTO: 3.99 MILL/UL (ref 4.7–6.1)
WBC # BLD AUTO: 10.3 THOU/UL (ref 4.8–10.8)

## 2019-08-19 RX ADMIN — DIVALPROEX SODIUM SCH MG: 500 TABLET, DELAYED RELEASE ORAL at 09:32

## 2019-08-20 NOTE — DIS
DATE OF ADMISSION:  08/15/2019



DATE OF DISCHARGE:  08/19/2019



PRIMARY CARE PHYSICIAN:  Norris Bynum MD



CHIEF COMPLAINT:  On admission was severe chills, presented to the emergency

department found to have fever, tachycardia, tachypnea, leukocytosis consistent with

sepsis, however, the source was unknown.  He has had cellulitis of lower extremity

leading to sepsis in the past from thorns and wood knots as he routinely walks about

in the bush.  However, he did not do this prior to this episode and does not have a

cough.  Urine is clear.  Does have a  shunt.  Chest x-ray of  shunt ending in

pleura showed stable pleural effusion.  No element of pneumonia.  CT of the head did

not show any significant swelling or evidence of likely infection brain.  Given the

patient was stabilized on vancomycin and transitioned from Zosyn to Rocephin, and

was stable and afebrile, did not pursue lumbar puncture.  The patient was

transitioned on Omnicef for outpatient transition as this may penetrate CSF a bit

better, discharged with rifampin in addition to heighten that. 



DISCHARGE CONDITION:  Good.



FOLLOWUP:  Followup with Dr. Norris Bynum, PCP, in one week.



DISCHARGE DIET:  Regular.



ACTIVITY:  As tolerated.  The patient was ambulatory and well prior to discharge.



DISCHARGE MEDICATIONS:  Include:

1. Rifampin 300 mg twice daily.

2. Cefdinir 300 mg twice daily.

3. Continue home rosuvastatin, olanzapine, sertraline with CPAP, fenofibrate, and

Depakote. 







Job ID:  [id number]

## 2019-09-02 ENCOUNTER — HOSPITAL ENCOUNTER (EMERGENCY)
Dept: HOSPITAL 92 - SCSER | Age: 44
Discharge: HOME | End: 2019-09-02
Payer: MEDICARE

## 2019-09-02 DIAGNOSIS — F32.9: ICD-10-CM

## 2019-09-02 DIAGNOSIS — F41.9: ICD-10-CM

## 2019-09-02 DIAGNOSIS — B37.2: Primary | ICD-10-CM

## 2019-09-02 DIAGNOSIS — E78.00: ICD-10-CM

## 2019-09-02 PROCEDURE — 99282 EMERGENCY DEPT VISIT SF MDM: CPT

## 2023-05-09 ENCOUNTER — HOSPITAL ENCOUNTER (OUTPATIENT)
Dept: HOSPITAL 92 - CSHCT | Age: 48
Discharge: HOME | End: 2023-05-09
Attending: FAMILY MEDICINE
Payer: MEDICARE

## 2023-05-09 DIAGNOSIS — S06.9X9D: Primary | ICD-10-CM

## 2023-05-09 DIAGNOSIS — Z98.2: ICD-10-CM

## 2023-05-09 DIAGNOSIS — S06.2X9D: ICD-10-CM

## 2023-05-09 PROCEDURE — 70470 CT HEAD/BRAIN W/O & W/DYE: CPT
